# Patient Record
Sex: MALE | Race: WHITE | Employment: OTHER | URBAN - METROPOLITAN AREA
[De-identification: names, ages, dates, MRNs, and addresses within clinical notes are randomized per-mention and may not be internally consistent; named-entity substitution may affect disease eponyms.]

---

## 2018-02-22 ENCOUNTER — HOSPITAL ENCOUNTER (OUTPATIENT)
Dept: PREADMISSION TESTING | Age: 78
Discharge: HOME | End: 2018-02-22
Payer: MEDICARE

## 2018-02-22 DIAGNOSIS — Z01.818 PRE-OP EXAM: ICD-10-CM

## 2018-02-22 LAB
ALBUMIN SERPL-MCNC: 4.2 G/DL (ref 3.4–5)
ALBUMIN/GLOB SERPL: 1.3 {RATIO} (ref 0.8–1.7)
ALP SERPL-CCNC: 77 U/L (ref 45–117)
ALT SERPL-CCNC: 34 U/L (ref 16–61)
ANION GAP SERPL CALC-SCNC: 8 MMOL/L (ref 3–18)
APPEARANCE UR: CLEAR
APTT PPP: 29.2 SEC (ref 23–36.4)
AST SERPL-CCNC: 23 U/L (ref 15–37)
BASOPHILS # BLD: 0 K/UL (ref 0–0.06)
BASOPHILS NFR BLD: 0 % (ref 0–2)
BILIRUB SERPL-MCNC: 0.6 MG/DL (ref 0.2–1)
BILIRUB UR QL: NEGATIVE
BUN SERPL-MCNC: 16 MG/DL (ref 7–18)
BUN/CREAT SERPL: 20 (ref 12–20)
CALCIUM SERPL-MCNC: 9.3 MG/DL (ref 8.5–10.1)
CHLORIDE SERPL-SCNC: 100 MMOL/L (ref 100–108)
CO2 SERPL-SCNC: 32 MMOL/L (ref 21–32)
COLOR UR: YELLOW
CREAT SERPL-MCNC: 0.8 MG/DL (ref 0.6–1.3)
DIFFERENTIAL METHOD BLD: ABNORMAL
EOSINOPHIL # BLD: 0.2 K/UL (ref 0–0.4)
EOSINOPHIL NFR BLD: 2 % (ref 0–5)
ERYTHROCYTE [DISTWIDTH] IN BLOOD BY AUTOMATED COUNT: 13 % (ref 11.6–14.5)
ERYTHROCYTE [SEDIMENTATION RATE] IN BLOOD: 5 MM/HR (ref 0–20)
EST. AVERAGE GLUCOSE BLD GHB EST-MCNC: 108 MG/DL
GLOBULIN SER CALC-MCNC: 3.3 G/DL (ref 2–4)
GLUCOSE SERPL-MCNC: 104 MG/DL (ref 74–99)
GLUCOSE UR STRIP.AUTO-MCNC: NEGATIVE MG/DL
HBA1C MFR BLD: 5.4 % (ref 4.5–5.6)
HCT VFR BLD AUTO: 44.2 % (ref 36–48)
HGB BLD-MCNC: 15.4 G/DL (ref 13–16)
HGB UR QL STRIP: NEGATIVE
INR PPP: 1 (ref 0.8–1.2)
KETONES UR QL STRIP.AUTO: NEGATIVE MG/DL
LEUKOCYTE ESTERASE UR QL STRIP.AUTO: NEGATIVE
LYMPHOCYTES # BLD: 1.5 K/UL (ref 0.9–3.6)
LYMPHOCYTES NFR BLD: 20 % (ref 21–52)
MCH RBC QN AUTO: 32 PG (ref 24–34)
MCHC RBC AUTO-ENTMCNC: 34.8 G/DL (ref 31–37)
MCV RBC AUTO: 91.7 FL (ref 74–97)
MONOCYTES # BLD: 0.8 K/UL (ref 0.05–1.2)
MONOCYTES NFR BLD: 11 % (ref 3–10)
NEUTS SEG # BLD: 5.1 K/UL (ref 1.8–8)
NEUTS SEG NFR BLD: 67 % (ref 40–73)
NITRITE UR QL STRIP.AUTO: NEGATIVE
PH UR STRIP: 6.5 [PH] (ref 5–8)
PLATELET # BLD AUTO: 195 K/UL (ref 135–420)
PMV BLD AUTO: 10.1 FL (ref 9.2–11.8)
POTASSIUM SERPL-SCNC: 3.5 MMOL/L (ref 3.5–5.5)
PROT SERPL-MCNC: 7.5 G/DL (ref 6.4–8.2)
PROT UR STRIP-MCNC: NEGATIVE MG/DL
PROTHROMBIN TIME: 12.6 SEC (ref 11.5–15.2)
RBC # BLD AUTO: 4.82 M/UL (ref 4.7–5.5)
SODIUM SERPL-SCNC: 140 MMOL/L (ref 136–145)
SP GR UR REFRACTOMETRY: 1.01 (ref 1–1.03)
UROBILINOGEN UR QL STRIP.AUTO: 0.2 EU/DL (ref 0.2–1)
WBC # BLD AUTO: 7.6 K/UL (ref 4.6–13.2)

## 2018-02-22 PROCEDURE — 85652 RBC SED RATE AUTOMATED: CPT | Performed by: ORTHOPAEDIC SURGERY

## 2018-02-22 PROCEDURE — 83036 HEMOGLOBIN GLYCOSYLATED A1C: CPT | Performed by: ORTHOPAEDIC SURGERY

## 2018-02-22 PROCEDURE — 93005 ELECTROCARDIOGRAM TRACING: CPT

## 2018-02-22 PROCEDURE — 80053 COMPREHEN METABOLIC PANEL: CPT | Performed by: ORTHOPAEDIC SURGERY

## 2018-02-22 PROCEDURE — 87641 MR-STAPH DNA AMP PROBE: CPT | Performed by: ORTHOPAEDIC SURGERY

## 2018-02-22 PROCEDURE — 81003 URINALYSIS AUTO W/O SCOPE: CPT | Performed by: ORTHOPAEDIC SURGERY

## 2018-02-22 PROCEDURE — 85025 COMPLETE CBC W/AUTO DIFF WBC: CPT | Performed by: ORTHOPAEDIC SURGERY

## 2018-02-22 PROCEDURE — 85730 THROMBOPLASTIN TIME PARTIAL: CPT | Performed by: ORTHOPAEDIC SURGERY

## 2018-02-22 PROCEDURE — 85610 PROTHROMBIN TIME: CPT | Performed by: ORTHOPAEDIC SURGERY

## 2018-02-22 PROCEDURE — 36415 COLL VENOUS BLD VENIPUNCTURE: CPT | Performed by: ORTHOPAEDIC SURGERY

## 2018-02-23 LAB
ATRIAL RATE: 58 BPM
BACTERIA SPEC CULT: NORMAL
CALCULATED P AXIS, ECG09: 55 DEGREES
CALCULATED R AXIS, ECG10: 78 DEGREES
CALCULATED T AXIS, ECG11: 18 DEGREES
DIAGNOSIS, 93000: NORMAL
P-R INTERVAL, ECG05: 186 MS
Q-T INTERVAL, ECG07: 442 MS
QRS DURATION, ECG06: 112 MS
QTC CALCULATION (BEZET), ECG08: 433 MS
SERVICE CMNT-IMP: NORMAL
VENTRICULAR RATE, ECG03: 58 BPM

## 2018-03-19 PROBLEM — M17.11 OSTEOARTHRITIS OF RIGHT KNEE: Chronic | Status: ACTIVE | Noted: 2018-03-19

## 2018-03-19 NOTE — H&P
[address] [facility] [address] Medicine  History and Physical Exam    Patient: Raymon Mena MRN: [id number]  SSN: [id number]    YOB: 1940  Age: 68 y.o. Sex: male      Subjective:      Chief Complaint: Right knee pain    History of Present Illness:  Patient complains of pain to the right knee and difficulty ambulating, which has progressively worsened over several months. X-rays showed osteoarthritis of the joint. The patient's pain has persisted and progressed despite conservative treatments and therapies. The patient has been previously treated with NSAIDs. The patient has at this time opted for surgical intervention. Past Medical History:   Diagnosis Date    Arthritis     knee, hand    Asthma     up to age 15    Hypertension 2004    Osteoarthritis of right knee 3/19/2018     No past surgical history on file. Social History     Occupational History    Not on file. Social History Main Topics    Smoking status: Never Smoker    Smokeless tobacco: Never Used    Alcohol use No    Drug use: No    Sexual activity: Not on file     Prior to Admission medications    Medication Sig Start Date End Date Taking? Authorizing Provider   amLODIPine (NORVASC) 10 mg tablet Take 10 mg by mouth daily. Historical Provider   simvastatin (ZOCOR) 10 mg tablet Take 10 mg by mouth nightly. Historical Provider   losartan-hydroCHLOROthiazide (HYZAAR) 100-25 mg per tablet Take 1 Tab by mouth daily. Historical Provider   ibuprofen (MOTRIN) 400 mg tablet Take  by mouth every six (6) hours as needed for Pain. Historical Provider       Allergies: Allergies   Allergen Reactions    Shrimp Other (comments)     Irritation on hands        Review of Systems:  Pertinent items are noted in the History of Present Illness.     Objective:       Physical Exam:  HEENT: Normocephalic, atraumatic  Lungs:  Clear to auscultation  Heart:   Regular rate and rhythm  Abdomen: Soft  Extremities:  Pain with range of motion of the right knee. Active ROM limited due to pain. Tenderness generalized. Crepitus present. Antalgic gait. Assessment:      Arthritis of the right knee. Plan:       Proceed with scheduled RIGHT TOTAL KNEE ARTHROPLASTY. The various methods of treatment have been discussed with the patient and family. After consideration of risks, benefits, and other options for treatment, the patient has consented to surgical interventions. Questions were answered and preoperative teaching was done by Dr Ezekiel Reyes.      Signed By: SANA Oseguera     March 19, 2018

## 2018-03-20 ENCOUNTER — ANESTHESIA EVENT (OUTPATIENT)
Dept: SURGERY | Age: 78
DRG: 470 | End: 2018-03-20
Payer: MEDICARE

## 2018-03-20 ENCOUNTER — HOSPITAL ENCOUNTER (INPATIENT)
Age: 78
LOS: 1 days | Discharge: HOME HEALTH | DRG: 470 | End: 2018-03-21
Attending: ORTHOPAEDIC SURGERY | Admitting: ORTHOPAEDIC SURGERY
Payer: MEDICARE

## 2018-03-20 ENCOUNTER — APPOINTMENT (OUTPATIENT)
Dept: GENERAL RADIOLOGY | Age: 78
DRG: 470 | End: 2018-03-20
Attending: PHYSICIAN ASSISTANT
Payer: MEDICARE

## 2018-03-20 ENCOUNTER — ANESTHESIA (OUTPATIENT)
Dept: SURGERY | Age: 78
DRG: 470 | End: 2018-03-20
Payer: MEDICARE

## 2018-03-20 DIAGNOSIS — M17.11 PRIMARY OSTEOARTHRITIS OF RIGHT KNEE: Primary | Chronic | ICD-10-CM

## 2018-03-20 LAB
ABO + RH BLD: NORMAL
BLOOD GROUP ANTIBODIES SERPL: NORMAL
SPECIMEN EXP DATE BLD: NORMAL

## 2018-03-20 PROCEDURE — 77030013708 HC HNDPC SUC IRR PULS STRY –B: Performed by: ORTHOPAEDIC SURGERY

## 2018-03-20 PROCEDURE — 76210000006 HC OR PH I REC 0.5 TO 1 HR: Performed by: ORTHOPAEDIC SURGERY

## 2018-03-20 PROCEDURE — 77030031139 HC SUT VCRL2 J&J -A: Performed by: ORTHOPAEDIC SURGERY

## 2018-03-20 PROCEDURE — 77030027138 HC INCENT SPIROMETER -A: Performed by: ORTHOPAEDIC SURGERY

## 2018-03-20 PROCEDURE — 74011250637 HC RX REV CODE- 250/637: Performed by: ORTHOPAEDIC SURGERY

## 2018-03-20 PROCEDURE — 77030002934 HC SUT MCRYL J&J -B: Performed by: ORTHOPAEDIC SURGERY

## 2018-03-20 PROCEDURE — C1776 JOINT DEVICE (IMPLANTABLE): HCPCS | Performed by: ORTHOPAEDIC SURGERY

## 2018-03-20 PROCEDURE — 74011000258 HC RX REV CODE- 258: Performed by: ORTHOPAEDIC SURGERY

## 2018-03-20 PROCEDURE — 77030020782 HC GWN BAIR PAWS FLX 3M -B: Performed by: ORTHOPAEDIC SURGERY

## 2018-03-20 PROCEDURE — 77030037876 HC DRSG MEPILEX 16-48IN BORD MOLN -A: Performed by: ORTHOPAEDIC SURGERY

## 2018-03-20 PROCEDURE — 77030020259 HC SOL INJ SOD CL 0.9% 100ML BG: Performed by: ORTHOPAEDIC SURGERY

## 2018-03-20 PROCEDURE — C1713 ANCHOR/SCREW BN/BN,TIS/BN: HCPCS | Performed by: ORTHOPAEDIC SURGERY

## 2018-03-20 PROCEDURE — 77030012508 HC MSK AIRWY LMA AMBU -A: Performed by: SPECIALIST

## 2018-03-20 PROCEDURE — 74011250637 HC RX REV CODE- 250/637: Performed by: PHYSICIAN ASSISTANT

## 2018-03-20 PROCEDURE — 77030011628: Performed by: ORTHOPAEDIC SURGERY

## 2018-03-20 PROCEDURE — 73560 X-RAY EXAM OF KNEE 1 OR 2: CPT

## 2018-03-20 PROCEDURE — 76060000034 HC ANESTHESIA 1.5 TO 2 HR: Performed by: ORTHOPAEDIC SURGERY

## 2018-03-20 PROCEDURE — 36415 COLL VENOUS BLD VENIPUNCTURE: CPT | Performed by: ORTHOPAEDIC SURGERY

## 2018-03-20 PROCEDURE — 86900 BLOOD TYPING SEROLOGIC ABO: CPT | Performed by: ORTHOPAEDIC SURGERY

## 2018-03-20 PROCEDURE — 77030034694 HC SCPL CANADY PLSM DISP USMD -E: Performed by: ORTHOPAEDIC SURGERY

## 2018-03-20 PROCEDURE — 77030003666 HC NDL SPINAL BD -A: Performed by: ORTHOPAEDIC SURGERY

## 2018-03-20 PROCEDURE — 77030020813 HC INST SCULP CEM KT DISP S&N -B: Performed by: ORTHOPAEDIC SURGERY

## 2018-03-20 PROCEDURE — 76942 ECHO GUIDE FOR BIOPSY: CPT | Performed by: ORTHOPAEDIC SURGERY

## 2018-03-20 PROCEDURE — 3E0T3BZ INTRODUCTION OF ANESTHETIC AGENT INTO PERIPHERAL NERVES AND PLEXI, PERCUTANEOUS APPROACH: ICD-10-PCS | Performed by: SPECIALIST

## 2018-03-20 PROCEDURE — 97161 PT EVAL LOW COMPLEX 20 MIN: CPT

## 2018-03-20 PROCEDURE — 74011250636 HC RX REV CODE- 250/636: Performed by: SPECIALIST

## 2018-03-20 PROCEDURE — 97116 GAIT TRAINING THERAPY: CPT

## 2018-03-20 PROCEDURE — 64450 NJX AA&/STRD OTHER PN/BRANCH: CPT | Performed by: ORTHOPAEDIC SURGERY

## 2018-03-20 PROCEDURE — 74011250636 HC RX REV CODE- 250/636: Performed by: ORTHOPAEDIC SURGERY

## 2018-03-20 PROCEDURE — 74011250637 HC RX REV CODE- 250/637: Performed by: SPECIALIST

## 2018-03-20 PROCEDURE — 77030011640 HC PAD GRND REM COVD -A: Performed by: ORTHOPAEDIC SURGERY

## 2018-03-20 PROCEDURE — 74011250636 HC RX REV CODE- 250/636: Performed by: PHYSICIAN ASSISTANT

## 2018-03-20 PROCEDURE — 74011000250 HC RX REV CODE- 250

## 2018-03-20 PROCEDURE — 76010000153 HC OR TIME 1.5 TO 2 HR: Performed by: ORTHOPAEDIC SURGERY

## 2018-03-20 PROCEDURE — 77030032489 HC SLV COMPR SCD FT CUF COVD -B: Performed by: ORTHOPAEDIC SURGERY

## 2018-03-20 PROCEDURE — 74011250636 HC RX REV CODE- 250/636

## 2018-03-20 PROCEDURE — 0SRC0J9 REPLACEMENT OF RIGHT KNEE JOINT WITH SYNTHETIC SUBSTITUTE, CEMENTED, OPEN APPROACH: ICD-10-PCS | Performed by: ORTHOPAEDIC SURGERY

## 2018-03-20 PROCEDURE — 74011000250 HC RX REV CODE- 250: Performed by: ORTHOPAEDIC SURGERY

## 2018-03-20 PROCEDURE — 77030038010: Performed by: ORTHOPAEDIC SURGERY

## 2018-03-20 PROCEDURE — 65270000029 HC RM PRIVATE

## 2018-03-20 PROCEDURE — 77030018836 HC SOL IRR NACL ICUM -A: Performed by: ORTHOPAEDIC SURGERY

## 2018-03-20 PROCEDURE — 77030037400 HC ADH TISS HI VISC EXOFIN CHMP -B: Performed by: ORTHOPAEDIC SURGERY

## 2018-03-20 PROCEDURE — 77030036563 HC WRP CLD THER KNE S2SG -B: Performed by: ORTHOPAEDIC SURGERY

## 2018-03-20 PROCEDURE — C9290 INJ, BUPIVACAINE LIPOSOME: HCPCS | Performed by: ORTHOPAEDIC SURGERY

## 2018-03-20 DEVICE — CEMENT BNE 40GM FULL DOSE PMMA W/O ANTIBIO HI VISC N RADPQ: Type: IMPLANTABLE DEVICE | Site: KNEE | Status: FUNCTIONAL

## 2018-03-20 RX ORDER — ASPIRIN 81 MG/1
81 TABLET ORAL 2 TIMES DAILY
Status: DISCONTINUED | OUTPATIENT
Start: 2018-03-20 | End: 2018-03-21 | Stop reason: HOSPADM

## 2018-03-20 RX ORDER — SODIUM CHLORIDE 9 MG/ML
300 INJECTION, SOLUTION INTRAVENOUS CONTINUOUS
Status: DISPENSED | OUTPATIENT
Start: 2018-03-20 | End: 2018-03-20

## 2018-03-20 RX ORDER — DEXTROSE 50 % IN WATER (D50W) INTRAVENOUS SYRINGE
25-50 AS NEEDED
Status: DISCONTINUED | OUTPATIENT
Start: 2018-03-20 | End: 2018-03-20 | Stop reason: HOSPADM

## 2018-03-20 RX ORDER — MELOXICAM 7.5 MG/1
7.5 TABLET ORAL 2 TIMES DAILY
Qty: 28 TAB | Refills: 0 | Status: SHIPPED | OUTPATIENT
Start: 2018-03-20 | End: 2018-04-03

## 2018-03-20 RX ORDER — AMLODIPINE BESYLATE 5 MG/1
10 TABLET ORAL DAILY
Status: DISCONTINUED | OUTPATIENT
Start: 2018-03-21 | End: 2018-03-21 | Stop reason: HOSPADM

## 2018-03-20 RX ORDER — PROPOFOL 10 MG/ML
INJECTION, EMULSION INTRAVENOUS AS NEEDED
Status: DISCONTINUED | OUTPATIENT
Start: 2018-03-20 | End: 2018-03-20 | Stop reason: HOSPADM

## 2018-03-20 RX ORDER — ACETAMINOPHEN 325 MG/1
650 TABLET ORAL EVERY 6 HOURS
Status: DISCONTINUED | OUTPATIENT
Start: 2018-03-20 | End: 2018-03-21 | Stop reason: HOSPADM

## 2018-03-20 RX ORDER — NALOXONE HYDROCHLORIDE 0.4 MG/ML
0.2 INJECTION, SOLUTION INTRAMUSCULAR; INTRAVENOUS; SUBCUTANEOUS AS NEEDED
Status: DISCONTINUED | OUTPATIENT
Start: 2018-03-20 | End: 2018-03-20 | Stop reason: HOSPADM

## 2018-03-20 RX ORDER — CELECOXIB 100 MG/1
400 CAPSULE ORAL
Status: COMPLETED | OUTPATIENT
Start: 2018-03-20 | End: 2018-03-20

## 2018-03-20 RX ORDER — ONDANSETRON 2 MG/ML
4 INJECTION INTRAMUSCULAR; INTRAVENOUS
Status: DISCONTINUED | OUTPATIENT
Start: 2018-03-20 | End: 2018-03-21 | Stop reason: HOSPADM

## 2018-03-20 RX ORDER — CEFAZOLIN SODIUM 2 G/50ML
2 SOLUTION INTRAVENOUS ONCE
Status: COMPLETED | OUTPATIENT
Start: 2018-03-20 | End: 2018-03-20

## 2018-03-20 RX ORDER — SODIUM CHLORIDE 0.9 % (FLUSH) 0.9 %
5-10 SYRINGE (ML) INJECTION AS NEEDED
Status: DISCONTINUED | OUTPATIENT
Start: 2018-03-20 | End: 2018-03-20 | Stop reason: HOSPADM

## 2018-03-20 RX ORDER — METOCLOPRAMIDE HYDROCHLORIDE 5 MG/ML
10 INJECTION INTRAMUSCULAR; INTRAVENOUS
Status: DISCONTINUED | OUTPATIENT
Start: 2018-03-20 | End: 2018-03-21 | Stop reason: HOSPADM

## 2018-03-20 RX ORDER — ZOLPIDEM TARTRATE 5 MG/1
5-10 TABLET ORAL
Status: DISCONTINUED | OUTPATIENT
Start: 2018-03-20 | End: 2018-03-21 | Stop reason: HOSPADM

## 2018-03-20 RX ORDER — INSULIN LISPRO 100 [IU]/ML
INJECTION, SOLUTION INTRAVENOUS; SUBCUTANEOUS ONCE
Status: DISCONTINUED | OUTPATIENT
Start: 2018-03-20 | End: 2018-03-20 | Stop reason: HOSPADM

## 2018-03-20 RX ORDER — DEXMEDETOMIDINE HYDROCHLORIDE 4 UG/ML
INJECTION, SOLUTION INTRAVENOUS AS NEEDED
Status: DISCONTINUED | OUTPATIENT
Start: 2018-03-20 | End: 2018-03-20 | Stop reason: HOSPADM

## 2018-03-20 RX ORDER — ACETAMINOPHEN 10 MG/ML
1000 INJECTION, SOLUTION INTRAVENOUS ONCE
Status: COMPLETED | OUTPATIENT
Start: 2018-03-20 | End: 2018-03-20

## 2018-03-20 RX ORDER — OXYCODONE HYDROCHLORIDE 5 MG/1
5-10 TABLET ORAL
Status: DISCONTINUED | OUTPATIENT
Start: 2018-03-20 | End: 2018-03-21 | Stop reason: HOSPADM

## 2018-03-20 RX ORDER — GLYCOPYRROLATE 0.2 MG/ML
INJECTION INTRAMUSCULAR; INTRAVENOUS AS NEEDED
Status: DISCONTINUED | OUTPATIENT
Start: 2018-03-20 | End: 2018-03-20 | Stop reason: HOSPADM

## 2018-03-20 RX ORDER — DEXAMETHASONE SODIUM PHOSPHATE 4 MG/ML
8 INJECTION, SOLUTION INTRA-ARTICULAR; INTRALESIONAL; INTRAMUSCULAR; INTRAVENOUS; SOFT TISSUE ONCE
Status: COMPLETED | OUTPATIENT
Start: 2018-03-20 | End: 2018-03-20

## 2018-03-20 RX ORDER — OXYCODONE AND ACETAMINOPHEN 5; 325 MG/1; MG/1
TABLET ORAL
Qty: 60 TAB | Refills: 0 | Status: SHIPPED | OUTPATIENT
Start: 2018-03-20 | End: 2019-06-07

## 2018-03-20 RX ORDER — TRANEXAMIC ACID 650 1/1
1950 TABLET ORAL ONCE
Status: COMPLETED | OUTPATIENT
Start: 2018-03-20 | End: 2018-03-20

## 2018-03-20 RX ORDER — LANOLIN ALCOHOL/MO/W.PET/CERES
1 CREAM (GRAM) TOPICAL 3 TIMES DAILY
Status: DISCONTINUED | OUTPATIENT
Start: 2018-03-20 | End: 2018-03-21 | Stop reason: HOSPADM

## 2018-03-20 RX ORDER — PANTOPRAZOLE SODIUM 40 MG/1
40 TABLET, DELAYED RELEASE ORAL DAILY
Status: DISCONTINUED | OUTPATIENT
Start: 2018-03-20 | End: 2018-03-21 | Stop reason: HOSPADM

## 2018-03-20 RX ORDER — DIPHENHYDRAMINE HCL 25 MG
25 CAPSULE ORAL
Status: DISCONTINUED | OUTPATIENT
Start: 2018-03-20 | End: 2018-03-21 | Stop reason: HOSPADM

## 2018-03-20 RX ORDER — PREGABALIN 75 MG/1
75 CAPSULE ORAL
Status: COMPLETED | OUTPATIENT
Start: 2018-03-20 | End: 2018-03-20

## 2018-03-20 RX ORDER — CEFAZOLIN SODIUM 2 G/50ML
2 SOLUTION INTRAVENOUS EVERY 8 HOURS
Status: COMPLETED | OUTPATIENT
Start: 2018-03-20 | End: 2018-03-21

## 2018-03-20 RX ORDER — DIPHENHYDRAMINE HYDROCHLORIDE 50 MG/ML
12.5 INJECTION, SOLUTION INTRAMUSCULAR; INTRAVENOUS
Status: DISCONTINUED | OUTPATIENT
Start: 2018-03-20 | End: 2018-03-21 | Stop reason: HOSPADM

## 2018-03-20 RX ORDER — SODIUM CHLORIDE 9 MG/ML
125 INJECTION, SOLUTION INTRAVENOUS CONTINUOUS
Status: DISCONTINUED | OUTPATIENT
Start: 2018-03-20 | End: 2018-03-21 | Stop reason: HOSPADM

## 2018-03-20 RX ORDER — FENTANYL CITRATE 50 UG/ML
25 INJECTION, SOLUTION INTRAMUSCULAR; INTRAVENOUS
Status: DISCONTINUED | OUTPATIENT
Start: 2018-03-20 | End: 2018-03-20 | Stop reason: HOSPADM

## 2018-03-20 RX ORDER — ACETAMINOPHEN 10 MG/ML
1000 INJECTION, SOLUTION INTRAVENOUS EVERY 6 HOURS
Status: DISCONTINUED | OUTPATIENT
Start: 2018-03-20 | End: 2018-03-20 | Stop reason: CLARIF

## 2018-03-20 RX ORDER — ASPIRIN 325 MG
325 TABLET ORAL 2 TIMES DAILY
Qty: 42 TAB | Refills: 0 | Status: SHIPPED | OUTPATIENT
Start: 2018-03-20 | End: 2018-04-10

## 2018-03-20 RX ORDER — SODIUM CHLORIDE 0.9 % (FLUSH) 0.9 %
5-10 SYRINGE (ML) INJECTION AS NEEDED
Status: DISCONTINUED | OUTPATIENT
Start: 2018-03-20 | End: 2018-03-21 | Stop reason: HOSPADM

## 2018-03-20 RX ORDER — KETOROLAC TROMETHAMINE 15 MG/ML
15 INJECTION, SOLUTION INTRAMUSCULAR; INTRAVENOUS EVERY 6 HOURS
Status: DISCONTINUED | OUTPATIENT
Start: 2018-03-20 | End: 2018-03-21 | Stop reason: HOSPADM

## 2018-03-20 RX ORDER — SODIUM CHLORIDE, SODIUM LACTATE, POTASSIUM CHLORIDE, CALCIUM CHLORIDE 600; 310; 30; 20 MG/100ML; MG/100ML; MG/100ML; MG/100ML
100 INJECTION, SOLUTION INTRAVENOUS CONTINUOUS
Status: DISCONTINUED | OUTPATIENT
Start: 2018-03-20 | End: 2018-03-20 | Stop reason: HOSPADM

## 2018-03-20 RX ORDER — MELOXICAM 7.5 MG/1
7.5 TABLET ORAL 2 TIMES DAILY
Status: DISCONTINUED | OUTPATIENT
Start: 2018-03-20 | End: 2018-03-21 | Stop reason: HOSPADM

## 2018-03-20 RX ORDER — NALOXONE HYDROCHLORIDE 0.4 MG/ML
0.4 INJECTION, SOLUTION INTRAMUSCULAR; INTRAVENOUS; SUBCUTANEOUS AS NEEDED
Status: DISCONTINUED | OUTPATIENT
Start: 2018-03-20 | End: 2018-03-21 | Stop reason: HOSPADM

## 2018-03-20 RX ORDER — SODIUM CHLORIDE 0.9 % (FLUSH) 0.9 %
5-10 SYRINGE (ML) INJECTION EVERY 8 HOURS
Status: DISCONTINUED | OUTPATIENT
Start: 2018-03-20 | End: 2018-03-21 | Stop reason: HOSPADM

## 2018-03-20 RX ORDER — ONDANSETRON 2 MG/ML
INJECTION INTRAMUSCULAR; INTRAVENOUS AS NEEDED
Status: DISCONTINUED | OUTPATIENT
Start: 2018-03-20 | End: 2018-03-20 | Stop reason: HOSPADM

## 2018-03-20 RX ORDER — SODIUM CHLORIDE, SODIUM LACTATE, POTASSIUM CHLORIDE, CALCIUM CHLORIDE 600; 310; 30; 20 MG/100ML; MG/100ML; MG/100ML; MG/100ML
125 INJECTION, SOLUTION INTRAVENOUS CONTINUOUS
Status: DISCONTINUED | OUTPATIENT
Start: 2018-03-20 | End: 2018-03-21 | Stop reason: HOSPADM

## 2018-03-20 RX ORDER — FENTANYL CITRATE 50 UG/ML
INJECTION, SOLUTION INTRAMUSCULAR; INTRAVENOUS AS NEEDED
Status: DISCONTINUED | OUTPATIENT
Start: 2018-03-20 | End: 2018-03-20 | Stop reason: HOSPADM

## 2018-03-20 RX ORDER — MAGNESIUM SULFATE 100 %
4 CRYSTALS MISCELLANEOUS AS NEEDED
Status: DISCONTINUED | OUTPATIENT
Start: 2018-03-20 | End: 2018-03-20 | Stop reason: HOSPADM

## 2018-03-20 RX ORDER — LIDOCAINE HYDROCHLORIDE 20 MG/ML
INJECTION, SOLUTION EPIDURAL; INFILTRATION; INTRACAUDAL; PERINEURAL AS NEEDED
Status: DISCONTINUED | OUTPATIENT
Start: 2018-03-20 | End: 2018-03-20 | Stop reason: HOSPADM

## 2018-03-20 RX ORDER — SIMVASTATIN 10 MG/1
10 TABLET, FILM COATED ORAL
Status: DISCONTINUED | OUTPATIENT
Start: 2018-03-20 | End: 2018-03-21 | Stop reason: HOSPADM

## 2018-03-20 RX ORDER — MIDAZOLAM HYDROCHLORIDE 1 MG/ML
INJECTION, SOLUTION INTRAMUSCULAR; INTRAVENOUS AS NEEDED
Status: DISCONTINUED | OUTPATIENT
Start: 2018-03-20 | End: 2018-03-20 | Stop reason: HOSPADM

## 2018-03-20 RX ORDER — DOCUSATE SODIUM 100 MG/1
100 CAPSULE, LIQUID FILLED ORAL 2 TIMES DAILY
Status: DISCONTINUED | OUTPATIENT
Start: 2018-03-20 | End: 2018-03-21 | Stop reason: HOSPADM

## 2018-03-20 RX ADMIN — MIDAZOLAM HYDROCHLORIDE 2 MG: 1 INJECTION, SOLUTION INTRAMUSCULAR; INTRAVENOUS at 11:25

## 2018-03-20 RX ADMIN — DOCUSATE SODIUM 100 MG: 100 CAPSULE, LIQUID FILLED ORAL at 21:06

## 2018-03-20 RX ADMIN — KETOROLAC TROMETHAMINE 15 MG: 15 INJECTION, SOLUTION INTRAMUSCULAR; INTRAVENOUS at 23:23

## 2018-03-20 RX ADMIN — DEXMEDETOMIDINE HYDROCHLORIDE 8 MCG: 4 INJECTION, SOLUTION INTRAVENOUS at 12:08

## 2018-03-20 RX ADMIN — SODIUM CHLORIDE, SODIUM LACTATE, POTASSIUM CHLORIDE, AND CALCIUM CHLORIDE: 600; 310; 30; 20 INJECTION, SOLUTION INTRAVENOUS at 12:08

## 2018-03-20 RX ADMIN — DEXMEDETOMIDINE HYDROCHLORIDE 8 MCG: 4 INJECTION, SOLUTION INTRAVENOUS at 12:02

## 2018-03-20 RX ADMIN — ACETAMINOPHEN 650 MG: 325 TABLET ORAL at 23:22

## 2018-03-20 RX ADMIN — SODIUM CHLORIDE, SODIUM LACTATE, POTASSIUM CHLORIDE, AND CALCIUM CHLORIDE 125 ML/HR: 600; 310; 30; 20 INJECTION, SOLUTION INTRAVENOUS at 13:34

## 2018-03-20 RX ADMIN — DEXMEDETOMIDINE HYDROCHLORIDE 8 MCG: 4 INJECTION, SOLUTION INTRAVENOUS at 12:17

## 2018-03-20 RX ADMIN — DEXAMETHASONE SODIUM PHOSPHATE 8 MG: 4 INJECTION, SOLUTION INTRAMUSCULAR; INTRAVENOUS at 10:20

## 2018-03-20 RX ADMIN — DEXMEDETOMIDINE HYDROCHLORIDE 16 MCG: 4 INJECTION, SOLUTION INTRAVENOUS at 11:55

## 2018-03-20 RX ADMIN — SODIUM CHLORIDE 125 ML/HR: 900 INJECTION, SOLUTION INTRAVENOUS at 18:08

## 2018-03-20 RX ADMIN — KETOROLAC TROMETHAMINE 15 MG: 15 INJECTION, SOLUTION INTRAMUSCULAR; INTRAVENOUS at 17:38

## 2018-03-20 RX ADMIN — OXYCODONE HYDROCHLORIDE 10 MG: 5 TABLET ORAL at 13:38

## 2018-03-20 RX ADMIN — MIDAZOLAM HYDROCHLORIDE 4 MG: 1 INJECTION, SOLUTION INTRAMUSCULAR; INTRAVENOUS at 10:23

## 2018-03-20 RX ADMIN — ASPIRIN 81 MG: 81 TABLET, COATED ORAL at 21:07

## 2018-03-20 RX ADMIN — ACETAMINOPHEN 1000 MG: 10 INJECTION, SOLUTION INTRAVENOUS at 11:25

## 2018-03-20 RX ADMIN — LIDOCAINE HYDROCHLORIDE 80 MG: 20 INJECTION, SOLUTION EPIDURAL; INFILTRATION; INTRACAUDAL; PERINEURAL at 11:35

## 2018-03-20 RX ADMIN — SODIUM CHLORIDE 300 ML/HR: 900 INJECTION, SOLUTION INTRAVENOUS at 14:49

## 2018-03-20 RX ADMIN — KETOROLAC TROMETHAMINE 15 MG: 15 INJECTION, SOLUTION INTRAMUSCULAR; INTRAVENOUS at 13:32

## 2018-03-20 RX ADMIN — ACETAMINOPHEN 650 MG: 325 TABLET ORAL at 17:38

## 2018-03-20 RX ADMIN — GLYCOPYRROLATE 0.2 MG: 0.2 INJECTION INTRAMUSCULAR; INTRAVENOUS at 11:25

## 2018-03-20 RX ADMIN — PROPOFOL 200 MG: 10 INJECTION, EMULSION INTRAVENOUS at 11:35

## 2018-03-20 RX ADMIN — FERROUS SULFATE TAB 325 MG (65 MG ELEMENTAL FE) 325 MG: 325 (65 FE) TAB at 21:07

## 2018-03-20 RX ADMIN — TRANEXAMIC ACID 1950 MG: 650 TABLET ORAL at 09:55

## 2018-03-20 RX ADMIN — FENTANYL CITRATE 50 MCG: 50 INJECTION, SOLUTION INTRAMUSCULAR; INTRAVENOUS at 12:06

## 2018-03-20 RX ADMIN — PANTOPRAZOLE SODIUM 40 MG: 40 TABLET, DELAYED RELEASE ORAL at 10:20

## 2018-03-20 RX ADMIN — FERROUS SULFATE TAB 325 MG (65 MG ELEMENTAL FE) 325 MG: 325 (65 FE) TAB at 17:38

## 2018-03-20 RX ADMIN — PREGABALIN 75 MG: 75 CAPSULE ORAL at 10:20

## 2018-03-20 RX ADMIN — CEFAZOLIN SODIUM 2 G: 2 SOLUTION INTRAVENOUS at 19:31

## 2018-03-20 RX ADMIN — CELECOXIB 400 MG: 100 CAPSULE ORAL at 10:20

## 2018-03-20 RX ADMIN — SIMVASTATIN 10 MG: 10 TABLET, FILM COATED ORAL at 21:07

## 2018-03-20 RX ADMIN — SODIUM CHLORIDE, SODIUM LACTATE, POTASSIUM CHLORIDE, AND CALCIUM CHLORIDE: 600; 310; 30; 20 INJECTION, SOLUTION INTRAVENOUS at 11:51

## 2018-03-20 RX ADMIN — SODIUM CHLORIDE, SODIUM LACTATE, POTASSIUM CHLORIDE, AND CALCIUM CHLORIDE 125 ML/HR: 600; 310; 30; 20 INJECTION, SOLUTION INTRAVENOUS at 09:59

## 2018-03-20 RX ADMIN — CEFAZOLIN SODIUM 2 G: 2 SOLUTION INTRAVENOUS at 11:28

## 2018-03-20 RX ADMIN — MELOXICAM 7.5 MG: 7.5 TABLET ORAL at 21:06

## 2018-03-20 RX ADMIN — SODIUM CHLORIDE, SODIUM LACTATE, POTASSIUM CHLORIDE, AND CALCIUM CHLORIDE 1000 ML: 600; 310; 30; 20 INJECTION, SOLUTION INTRAVENOUS at 10:00

## 2018-03-20 RX ADMIN — ONDANSETRON 4 MG: 2 INJECTION INTRAMUSCULAR; INTRAVENOUS at 11:38

## 2018-03-20 RX ADMIN — FENTANYL CITRATE 50 MCG: 50 INJECTION, SOLUTION INTRAMUSCULAR; INTRAVENOUS at 11:35

## 2018-03-20 NOTE — IP AVS SNAPSHOT
[facility] 
 
 
 [address] [place] [id number] 
[phone] Patient: Andrew Yeager MRN: [id number] TQU:84/86/2301 A check beverly indicates which time of day the medication should be taken. My Medications START taking these medications Instructions Each Dose to Equal  
 Morning Noon Evening Bedtime  
 aspirin 325 mg tablet Commonly known as:  ASPIRIN Your last dose was: Your next dose is: Take 1 Tab by mouth two (2) times a day for 21 days. 325 mg  
    
   
   
   
  
 meloxicam 7.5 mg tablet Commonly known as:  MOBIC Your last dose was: Your next dose is: Take 1 Tab by mouth two (2) times a day for 14 days. 7.5 mg  
    
   
   
   
  
 oxyCODONE-acetaminophen 5-325 mg per tablet Commonly known as:  PERCOCET Your last dose was: Your next dose is: Take 1-2 tablets by mouth every 4-6 hours as needed for pain. CONTINUE taking these medications Instructions Each Dose to Equal  
 Morning Noon Evening Bedtime  
 amLODIPine 10 mg tablet Commonly known as:  Jillyn Boast Your last dose was: Your next dose is: Take 10 mg by mouth daily. 10 mg  
    
   
   
   
  
 losartan-hydroCHLOROthiazide 100-25 mg per tablet Commonly known as:  HYZAAR Your last dose was: Your next dose is: Take 1 Tab by mouth daily. 1 Tab  
    
   
   
   
  
 simvastatin 10 mg tablet Commonly known as:  ZOCOR Your last dose was: Your next dose is: Take 10 mg by mouth nightly. 10 mg  
    
   
   
   
  
  
STOP taking these medications   
 ibuprofen 400 mg tablet Commonly known as:  MOTRIN Where to Get Your Medications Information on where to get these meds will be given to you by the nurse or doctor. ! Ask your nurse or doctor about these medications  
  aspirin 325 mg tablet  
 meloxicam 7.5 mg tablet  
 oxyCODONE-acetaminophen 5-325 mg per tablet

## 2018-03-20 NOTE — OP NOTES
[address] [place] [address],[address] Medicine  Total Knee Arthroplasty    Patient: Izzy Loja MRN: [id number]  SSN: [id number]    YOB: 1940  Age: 68 y.o. Sex: male      Date of Surgery: 3/20/2018   Preoperative Diagnosis: UNILATERAL PRIMARY OSTEOARTHRITIS, RIGHT KNEE   Postoperative Diagnosis: UNILATERAL PRIMARY OSTEOARTHRITIS, RIGHT KNEE   Location: [facility]  Surgeon: Anita Son MD  Assistant: Aretha Suh PA-C    Anesthesia: General and Femoral Nerve Block    Procedure: Total Knee Arthroplasty: orthodevelopment  The complexity of the total joint surgery requires the use of a first assistant for positioning, retraction and assistance in closure. Tourniquet Time: Tourniquet not used. Estimated Blood Loss: Less than 100cc     Implants:   Implant Name Type Inv. Item Serial No.  Lot No. LRB No. Used Action   CEMENT BNE SMARTSET HV 40GM -- SMARTSET - [id number]  CEMENT BNE SMARTSET HV 40GM -- SMARTSET  [facility] [employer] [id number] Right 1 Implanted   CEMENT BNE SMARTSET HV 40GM -- SMARTSET - [id number]  CEMENT BNE SMARTSET HV 40GM -- SMARTSET  [facility] [employer] [id number] Right 1 Implanted   SIZE 5 TIBIAL TRAY NONPOROUS    ORTHO DEVELOPMENT [id number] Right 1 Implanted   SIZE 5 7MM TIBIAL INSERT CR    ORTHO DEVELOPMENT [id number] Right 1 Implanted   35MM PATELLA    ORTHO DEVELOPMENT [id number] Right 1 Implanted   RT SIZE 6 FEMORAL CR NONPOROUS       ORTHO DEVELOPMENT [id number] Right 1 Implanted        Specimens: None    Additional Findings: None     Body Mass Index: Body mass index is 28.79 kg/(m^2). Procedure Detail:  Prior to the surgery the patient was administered a femoral nerve block in the preoperative holding area by the anesthesiologist. Izzy Loja was brought to the operating room and positioned on the operating table. He was anesthetized with anesthesia. Intravenous antibiotics were administered.  Prior to the incision being made a timeout was called identifying the patient, procedure, operative side, and surgeon. A pneumatic tourniquet was placed about the limb and the right leg was prepped and draped in the usual sterile manner. The tourniquet was not inflated throughout the case. A midline anterior incision made over the knee. The incision was carried down through the subcutaneous tissue to the underlying capsule. A medial parapatellar capsular incision was performed. The medial capsular flap was carefully elevated around to the posterior medial corner protecting the medial collateral ligaments and the fibers. The patella was sized with a caliper, and approximately 10-12 mm was resected with an oscillating saw allowing the patella to be slid into the lateral gutter. It was not everted throughout the case. Our attention was first turned to the distal femur and using intermedullary instrumentation, a 5-degree valgus cut was on the distal end of the femur. The distal end of the femur was sized to a size 6 femoral component. Pins were inserted through the sizer and the corresponding 4-in-1 block was slid into place and pinned for stability. Anterior and posterior and chamfer cuts were made to accommodate the femoral component. The medial and lateral menisci were excised as were the anterior cruciate ligaments. Our attention was then turned to the tibia. Using extramedullary instrumentation, a 3-degree cut was made on the proximal end of the tibia. A spacer block was placed to show gaps had been balanced and a size 5  tibial base plate was placed on the tibia and pinned into place. Intramedullary reaming guide was placed on the tibia and the appropriate reamer was used followed by the keel punch to complete the preparation of the tibia. A trial femoral component was then impacted on to the distal end of the femur. Trial reduction was then performed with incremental size trial bearing surfaces.  The Orthodevelopment BKS CR 7mm bearing surface matching the femur was inserted and allowed for full extension, good medial, lateral stability at 90 degrees of flexion, especially medially. Our attention was then turned to the patella. The patella was sized to a 35mm oval DePuy patella. The guide was pinned, placed over patella, 3 holes were drilled. Trial patella button was inserted and the patella was reduced in the knee. The patella tracked normally using no-touch technique. The trial components were then all removed. The real components were opened on the back. The cut surfaces of the bone were prepared using the PulsaVac lavage. Prior to this, the Aquamantys was used to cauterize any soft tissue bleeding. 40 grams of Depuy HV cement was mixed. The femoral and tibial components  and patellar component was cemented into place. Excess cement was removed from around the edge of bone using plastic curette. Once the cement had hardened, the knee was placed through range of motion and noted to be stable as mentioned above with the trail components. The wound was dry, therefore no drain was used. The operative knee was injected with 20 cc of exparel. The knee was then soaked with a diluted betadine solution for approximately 3 min. This was then thoroughly irrigated. The capsular layer was closed using a #2 stratafix suture with the knee flexed 90 degrees, while subcutaneous layers were closed using 2-0 Vicryl suture. Finally the skin was closed using Prineo, which were applied in occlusive fashion and sterile bandage applied. An Iceman cryotherapy pad was applied on the operative leg. Sponge count and needle counts were correct. He was taken to the recovery room extubated in stable condition.     Signed By: Anita Son MD     March 20, 2018

## 2018-03-20 NOTE — PERIOP NOTES
Pt transferred to room 209. Jean-Paul Nix RN assuming care. Blood pressure 129/70, pulse 67, temperature 97.4 °F (36.3 °C), resp. rate 16, height 5' 8.5\" (1.74 m), weight 87.1 kg (192 lb 2 oz), SpO2 98 %. See admission note for skin assessment.

## 2018-03-20 NOTE — PERIOP NOTES
TRANSFER - OUT REPORT:    Verbal report given to Margarita Abraham RN(name) on Sahara Slaughter  being transferred to room 209(unit) for routine progression of care       Report consisted of patients Situation, Background, Assessment and   Recommendations(SBAR). Information from the following report(s) OR Summary, Intake/Output and MAR was reviewed with the receiving nurse. Lines:   Peripheral IV 03/20/18 Right Forearm (Active)   Site Assessment Clean, dry, & intact 3/20/2018  1:45 PM   Phlebitis Assessment 0 3/20/2018  1:45 PM   Infiltration Assessment 0 3/20/2018  1:45 PM   Dressing Status Clean, dry, & intact 3/20/2018  1:45 PM   Dressing Type Transparent 3/20/2018  1:45 PM   Hub Color/Line Status Green; Infusing 3/20/2018  1:45 PM        Opportunity for questions and clarification was provided.       Patient transported with:   O2 @ 2 liters

## 2018-03-20 NOTE — PERIOP NOTES
Pt assigned to room 209. Skylar Rashid RN looking at [employer]. Family updated on pts progression of care.

## 2018-03-20 NOTE — PROGRESS NOTES
1435 - Patient arrives to unit at this time, dual skin assessment completed, no wounds noted. PACU nurse stated he has scab to right heel, ACE dressing in place and unable to assess. Patient asked to void, he wanted to ambulate to restroom, straight leg test completed, patient able to lift leg multiple times without assistance, patient assisted to side of bed and then ambulated to restroom with assistance of staff. Patient voided without difficulty and ambulated back to bed. Admission assessment completed. Patient is A/O x 4. Lungs clear, radial pulses present , pedal pulses present , abdomen soft and non-distended. Bowel sounds active, 18 G IV to RFA  intact and patent infusing. No signs of phlebitis or infiltration noted. JAGJIT hose applied to LLE and plexis applied bilaterally. Skin warm and dry  with  ACE dressing to RLE CDI. Patient denies numbness/tingling. Pain 3/10. Patient was oriented to the room to include use of call bell, meal ordering, and use of incentive spirometer. Patient was given explanation of \" up for dinner\" program and has verbalized understanding. Phone and call bell left within reach. Plan of care for the day addressed with patient. Educated on pain medication availability and possible side effects. Shift Summary: Patient has not complained of pain this shift. IV remains intact. Dressing to right knee remains CDI. TEDS and SCD compression device in place.

## 2018-03-20 NOTE — IP AVS SNAPSHOT
[address] [address] 
[phone] Patient: Raymon Mena MRN: [id number] RU:98/39/4106 About your hospitalization You were admitted on:  March 20, 2018 You last received care in the:  THE [facility] [address] You were discharged on:  March 21, 2018 Why you were hospitalized Your primary diagnosis was:  Osteoarthritis Of Right Knee Follow-up Information Follow up With Details Comments Contact Info Brenda Pelletier MD On 4/4/2018 Follow up appointment @ 1:45pm [address] [place] [address] [address] [facility] 
[phone] Ignacia Coe MD   [place] [address] [address] [place] [address] 
[phone] Discharge Orders None A check beverly indicates which time of day the medication should be taken. My Medications START taking these medications Instructions Each Dose to Equal  
 Morning Noon Evening Bedtime  
 aspirin 325 mg tablet Commonly known as:  ASPIRIN Your last dose was: Your next dose is: Take 1 Tab by mouth two (2) times a day for 21 days. 325 mg  
    
   
   
   
  
 meloxicam 7.5 mg tablet Commonly known as:  MOBIC Your last dose was: Your next dose is: Take 1 Tab by mouth two (2) times a day for 14 days. 7.5 mg  
    
   
   
   
  
 oxyCODONE-acetaminophen 5-325 mg per tablet Commonly known as:  PERCOCET Your last dose was: Your next dose is: Take 1-2 tablets by mouth every 4-6 hours as needed for pain. CONTINUE taking these medications Instructions Each Dose to Equal  
 Morning Noon Evening Bedtime  
 amLODIPine 10 mg tablet Commonly known as:  Nadja Connor Your last dose was: Your next dose is: Take 10 mg by mouth daily.   
 10 mg  
    
   
   
   
  
 losartan-hydroCHLOROthiazide 100-25 mg per tablet Commonly known as:  HYZAAR Your last dose was: Your next dose is: Take 1 Tab by mouth daily. 1 Tab  
    
   
   
   
  
 simvastatin 10 mg tablet Commonly known as:  ZOCOR Your last dose was: Your next dose is: Take 10 mg by mouth nightly. 10 mg  
    
   
   
   
  
  
STOP taking these medications   
 ibuprofen 400 mg tablet Commonly known as:  MOTRIN Where to Get Your Medications Information on where to get these meds will be given to you by the nurse or doctor. ! Ask your nurse or doctor about these medications  
  aspirin 325 mg tablet  
 meloxicam 7.5 mg tablet  
 oxyCODONE-acetaminophen 5-325 mg per tablet Discharge Instructions [address] [place] [address] Patient Discharge Instructions Doug Gerardoia / [id number] : 1940 Admitted 3/20/2018 Discharged: 3/20/2018 IF YOU HAVE ANY PROBLEMS ONCE YOU ARE AT HOME CALL THE FOLLOWING NUMBERS:  
Main office number: [phone] Your follow up appointment to see either Dr. Barry Barr PA-C, or [facility] ANGELITA as scheduled in 2 weeks. If you are unsure of your appointment date call the office at [phone]. Medication Instructions · Resume your home medictions as directed, you may have directed not to resume supplements until after your follow up. · A prescription for pain medication has been given · It is important that you take the medication exactly as they are prescribed. · Keep your medication in the bottles provided by the pharmacist and keep a list of the medication names, dosages, and times to be taken in your wallet. · Do not take other medications without consulting your doctor. What to do at [facility] Resume your prehospital diet. If you have excessive nausea or vomitting call your doctor's office. Be sure to maintain adequate fluid intake. Some pain medications may cause constipation. Remember to drink fluids, stay as active as possible, and eat plenty of fiber-rich foods. Begin In-Home Physical Therapy; 3 times a week to work on gait training, range of motion, strengthening, and weight bearing exercises as tolerable. Continue to use your walker or cane when walking. May progress from the walker to a cane to complete total bearing as tolerable. Patient may shower. Wrap incision with plastic wrap/covering to prevent incision from getting wet. Avoid complete immersion. YOUR DRESSING SHOULD BE CHANGED IN 3-5 DAYS BY [facility] NURSE. When to Call - Call if you have a temperature greater then 101 
- Unable to keep food down - Are unable to bear any wieght  
- Need a pain medication refill Information obtained by : 
I understand that if any problems occur once I am at home I am to contact my physician. I understand and acknowledge receipt of the instructions indicated above. Physician's or R.N.'s Signature                                                                  Date/Time Patient or Representative Signature                                                          Date/Time [facility] Announcement We are excited to announce that we are making your provider's discharge notes available to you in [employer]. You will see these notes when they are completed and signed by the physician that discharged you from your recent hospital stay.   If you have any questions or concerns about any information you see in [facility], please call the [employer] Department where you were seen or reach out to your Primary Care Provider for more information about your plan of care. Introducing [address]! Sasha Godwin introduces [employer] patient portal. Now you can access parts of your medical record, email your doctor's office, and request medication refills online. 1. In your internet browser, go to [email or web address] 2. Click on the First Time User? Click Here link in the Sign In box. You will see the New Member Sign Up page. 3. Enter your [employer] Access Code exactly as it appears below. You will not need to use this code after youve completed the sign-up process. If you do not sign up before the expiration date, you must request a new code. · [employer] Access Code: [email or web address] Expires: 5/23/2018 12:29 PM 
 
4. Enter the last four digits of your Social Security Number (xxxx) and Date of Birth (mm/dd/yyyy) as indicated and click Submit. You will be taken to the next sign-up page. 5. Create a [employer] ID. This will be your [employer] login ID and cannot be changed, so think of one that is secure and easy to remember. 6. Create a [employer] password. You can change your password at any time. 7. Enter your Password Reset Question and Answer. This can be used at a later time if you forget your password. 8. Enter your e-mail address. You will receive e-mail notification when new information is available in [address] 9. Click Sign Up. You can now view and download portions of your medical record. 10. Click the Download Summary menu link to download a portable copy of your medical information. If you have questions, please visit the Frequently Asked Questions section of the [employer] website. Remember, [employer] is NOT to be used for urgent needs. For medical emergencies, dial 911. Now available from your iPhone and Android! Providers Seen During Your Hospitalization Provider Specialty Primary office phone Ann-Marie Singh MD Orthopedic Surgery [phone] Your Primary Care Physician (PCP) Primary Care Physician Office Phone Office Fax [address] [place] [address] [phone] You are allergic to the following Allergen Reactions Shrimp Other (comments) Irritation on hands Recent Documentation Height Weight BMI Smoking Status 1.74 m 87.1 kg 28.79 kg/m2 Never Smoker Emergency Contacts Name Discharge Info Relation Home Work Mobile Herve Ballesteros(Son-N-Law) DISCHARGE CAREGIVER [3] Other Relative [6] [phone] Patient Belongings The following personal items are in your possession at time of discharge: 
  Dental Appliances: None  Visual Aid: Glasses      Home Medications: None   Jewelry: None  Clothing: Undergarments, Socks, Footwear, Shirt, Pants (with Baron Mckinley)    Other Valuables: Cell Phone, Dorothyann Reasons (with Herve) Please provide this summary of care documentation to your next provider. Signatures-by signing, you are acknowledging that this After Visit Summary has been reviewed with you and you have received a copy. Patient Signature:  ____________________________________________________________ Date:  ____________________________________________________________  
  
Shazia Snyder Provider Signature:  ____________________________________________________________ Date:  ____________________________________________________________

## 2018-03-20 NOTE — ANESTHESIA PREPROCEDURE EVALUATION
Anesthetic History   No history of anesthetic complications            Review of Systems / Medical History  Patient summary reviewed, nursing notes reviewed and pertinent labs reviewed    Pulmonary            Asthma : well controlled       Neuro/Psych   Within defined limits           Cardiovascular    Hypertension: well controlled                   GI/Hepatic/Renal  Within defined limits              Endo/Other        Arthritis     Other Findings              Physical Exam    Airway  Mallampati: II  TM Distance: 4 - 6 cm  Neck ROM: normal range of motion   Mouth opening: Normal     Cardiovascular    Rhythm: regular  Rate: normal         Dental    Dentition: Caps/crowns     Pulmonary  Breath sounds clear to auscultation               Abdominal  GI exam deferred       Other Findings            Anesthetic Plan    ASA: 2  Anesthesia type: general and regional  Adductor canal block        Induction: Intravenous  Anesthetic plan and risks discussed with: Family and Patient      R/B discussed including nerve injury.

## 2018-03-20 NOTE — PERIOP NOTES
Received patient from [place] [address] and anesthesia provider. Reviewed surgical procedure, intraoperative course. Patient identified.

## 2018-03-20 NOTE — ANESTHESIA PROCEDURE NOTES
Peripheral Block    Start time: 3/20/2018 10:23 AM  End time: 3/20/2018 10:27 AM  Performed by: Benito Marcum by: Wilfrido Riley       Pre-procedure: Indications: at surgeon's request, post-op pain management and procedure for pain    Preanesthetic Checklist: patient identified, risks and benefits discussed, site marked, timeout performed, anesthesia consent given and patient being monitored    Timeout Time: 10:23          Block Type:   Block Type:   Adductor canal  Laterality:  Right  Monitoring:  Standard ASA monitoring, continuous pulse ox, frequent vital sign checks, heart rate, responsive to questions and oxygen  Injection Technique:  Single shot  Procedures: ultrasound guided    Patient Position: supine  Prep: chlorhexidine    Location:  Mid thigh  Needle Type:  Stimuplex  Needle Gauge:  21 G  Needle Localization:  Ultrasound guidance  Medication Injected:  0.5%  ropivacaine  Volume (mL):  30    Assessment:  Number of attempts:  1  Injection Assessment:  Incremental injection every 5 mL, local visualized surrounding nerve on ultrasound, negative aspiration for blood, no intravascular symptoms, no paresthesia and ultrasound image on chart  Patient tolerance:  Patient tolerated the procedure well with no immediate complications

## 2018-03-20 NOTE — DISCHARGE SUMMARY
[address] [address]     DISCHARGE SUMMARY     PATIENT: Adolfo Jackson     MRN: [id number]   ADMIT DATE: 3/20/2018   [facility]   DISCHARGE DATE:      ATTENDING: Albert Schuster MD   DICTATING: SANA Novak         ADMISSION DIAGNOSIS: UNILATERAL PRIMARY OSTEOARTHRITIS, RIGHT KNEE  Osteoarthritis of right knee    DISCHARGE DIAGNOSIS: Status post RIGHT TOTAL KNEE ARTHROPLASTY    HISTORY OF PRESENT ILLNESS: The patient is a 68y.o. year-old male   with ongoing right knee pain secondary to osteoarthritis of his right knee. The patient's pain has persisted and progressed despite conservative treatments and therapies. The patient has at this time opted for surgical intervention. PAST MEDICAL HISTORY:   Past Medical History:   Diagnosis Date    Arthritis     knee, hand    Asthma     up to age 15    Hypertension 2004    Osteoarthritis of right knee 3/19/2018       PAST SURGICAL HISTORY: History reviewed. No pertinent surgical history. ALLERGIES:   Allergies   Allergen Reactions    Shrimp Other (comments)     Irritation on hands        CURRENT MEDICATIONS:  A list of medications prior to the time of admission include:  Prior to Admission medications    Medication Sig Start Date End Date Taking? Authorizing Provider   aspirin (ASPIRIN) 325 mg tablet Take 1 Tab by mouth two (2) times a day for 21 days. 3/20/18 4/10/18 Yes SANA Arndt   meloxicam (MOBIC) 7.5 mg tablet Take 1 Tab by mouth two (2) times a day for 14 days. 3/20/18 4/3/18 Yes SANA Arndt   oxyCODONE-acetaminophen (PERCOCET) 5-325 mg per tablet Take 1-2 tablets by mouth every 4-6 hours as needed for pain. 3/20/18  Yes SANA Arndt   amLODIPine (NORVASC) 10 mg tablet Take 10 mg by mouth daily. Yes Historical Provider   simvastatin (ZOCOR) 10 mg tablet Take 10 mg by mouth nightly.    Yes Historical Provider   losartan-hydroCHLOROthiazide (HYZAAR) 100-25 mg per tablet Take 1 Tab by mouth daily. Yes Historical Provider   ibuprofen (MOTRIN) 400 mg tablet Take  by mouth every six (6) hours as needed for Pain. Yes Historical Provider       FAMILY HISTORY: History reviewed. No pertinent family history. SOCIAL HISTORY:   Social History     Social History    Marital status:      Spouse name: N/A    Number of children: N/A    Years of education: N/A     Social History Main Topics    Smoking status: Never Smoker    Smokeless tobacco: Never Used    Alcohol use No    Drug use: No    Sexual activity: Not Asked     Other Topics Concern    None     Social History Narrative       REVIEW OF SYSTEMS: All review of systems are negative. PHYSICAL EXAMINATION: For a detailed physical exam, please refer to the patient's chart. HOSPITAL COURSE: The patient was taken to surgery the day of admission. he underwent a right total knee replacement. Operative course was benign. Estimated blood loss was approximately 150 cc. The patient was taken to the PACU in stable condition and was later taken to the floor in stable condition. During his hospital stay, the patient progressed well with physical therapy and occupational therapy, adherent to instructions. he had been cleared by physical therapy with stair training. he was placed on Aspirin for DVT prophylaxis. his pain has been well controlled with oral pain medications. his vitals have remained stable. he has also remained hemodynamically stable. The patient has been recommended for discharge home. DISCHARGE INSTRUCTIONS: The patient is to be discharged home. he is to continue on his prior medications per the medication reconciliation form, to which we will add:  1. Aspirin 81 mg; 1 tablet po bid x 21 days  2. Mobic 7.5 mg; 1 tablet po bid x 14 days  3.  Percocet 5/325 mg; 1-2 tablets p.o. every 4 to 6 hours p.r.n. for pain    The patient is to continue at home with home physical therapy 3 times a week to work on gait training, range of motion, strengthening, and weightbearing exercises as tolerated on his right lower extremity. The patient is to progress from a walker to a cane to complete total weightbearing as tolerable. The patient is to continue to keep his incision dry. The patient is to followup with Dr. Lance Lam, Tamia Earl PA-C and/or Jillian Chaves PA-C in the office approximately 10-14 days status post for x-rays and further evaluation.     SANA Teixeira  3/20/2018

## 2018-03-20 NOTE — ROUTINE PROCESS
Bedside and Verbal shift change report given to [address] by Bo Waddell RN.  Report included the following information SBAR, Kardex, OR Summary, Intake/Output and MAR

## 2018-03-20 NOTE — ANESTHESIA POSTPROCEDURE EVALUATION
.  Post-Anesthesia Evaluation & Assessment    Visit Vitals    /58    Pulse 62    Temp 36.3 °C (97.4 °F)    Resp 15    Ht 5' 8.5\" (1.74 m)    Wt 87.1 kg (192 lb 2 oz)    SpO2 100%    BMI 28.79 kg/m2       Nausea/Vomiting: no nausea    Post-operative hydration adequate.     Pain score (VAS): 2    Mental status & Level of consciousness: alert and oriented x 3    Neurological status: moves all extremities, sensation grossly intact    Pulmonary status: airway patent, no supplemental oxygen required    Complications related to anesthesia: none    Additional comments:

## 2018-03-20 NOTE — PERIOP NOTES
Pt. Used restroom in pre-op area with assistance. Patient placed on Ashwin Paws for a minimum of 30 min in  Preop.

## 2018-03-20 NOTE — INTERVAL H&P NOTE
H&P Update:  Tiffanie Meadows was seen and examined. History and physical has been reviewed. The patient has been examined.  There have been no significant clinical changes since the completion of the originally dated History and Physical.    Signed By: Ny Connor MD     March 20, 2018 10:50 AM

## 2018-03-20 NOTE — PROGRESS NOTES
Problem: Mobility Impaired (Adult and Pediatric)  Goal: *Acute Goals and Plan of Care (Insert Text)  Goals to be addressed in 1-4 days:  STG  1. Rolling L to R to L modified independent for positioning. 2. Supine to sit to supine S with HR for meals. 3. Sit to stand to sit S with RW in prep for ambulation. LTG  1. Ambulate >150ft S with RW, WBAT, for home/community mobility. 2. Ascend/descend a >3 stair steps CGA with HR for home entry. 3. Tolerate up in chair 1-2 hours for ADLs. 4. Patient/family to be independent with HEP for follow-up care and safe discharge. Outcome: Progressing Towards Goal  physical Therapy EVALUATION    Patient: Nicolle Hampton (87 y.o. male)  Date: 3/20/2018  Primary Diagnosis: UNILATERAL PRIMARY OSTEOARTHRITIS, RIGHT KNEE  Osteoarthritis of right knee  Procedure(s) (LRB):  RIGHT TOTAL KNEE REPLACEMENT (Right) Day of Surgery   Precautions:   Fall, WBAT    ASSESSMENT :  Based on the objective data described below, the patient presents with lower extremity weakness, decreased gait quality and endurance, decreased R knee ROM/flexibility, and overall limitations in functional mobility s/p R TKR. Pt performed sit to stand with CGA. Patient ambulated 100 feet with RW, GB applied, CGA, WBAT. Pt tolerated session well as evidenced by no c/o increased pain, no lightheadedness or dizziness, SPO2 >95% on RA. Patient would benefit from skilled inpatient physical therapy to address deficits, progress as tolerated to achieve long term goals and allow safe discharge. Patient will benefit from skilled intervention to address the above impairments.   Patients rehabilitation potential is considered to be Good  Factors which may influence rehabilitation potential include:   []         None noted  []         Mental ability/status  [x]         Medical condition  []         Home/family situation and support systems  []         Safety awareness  [x]         Pain tolerance/management  []         Other: PLAN :  Recommendations and Planned Interventions:  [x]           Bed Mobility Training             [x]    Neuromuscular Re-Education  [x]           Transfer Training                   []    Orthotic/Prosthetic Training  [x]           Gait Training                          []    Modalities  [x]           Therapeutic Exercises          []    Edema Management/Control  [x]           Therapeutic Activities            [x]    Patient and Family Training/Education  []           Other (comment):    Frequency/Duration: Patient will be followed by physical therapy twice daily to address goals. Discharge Recommendations: Home Health  Further Equipment Recommendations for Discharge: N/A     SUBJECTIVE:   Patient stated I am doing great.     OBJECTIVE DATA SUMMARY:     Past Medical History:   Diagnosis Date    Arthritis     knee, hand    Asthma     up to age 15    Hypertension 2004    Osteoarthritis of right knee 3/19/2018   History reviewed. No pertinent surgical history. Barriers to Learning/Limitations: None  Compensate with: N/A  Prior Level of Function/Home Situation: Independent amb s/AD  Home Situation  Home Environment: Private residence  # Steps to Enter: 3  Rails to Enter: Yes  Hand Rails : Bilateral  One/Two Story Residence: Two story, live on 1st floor  Living Alone: No  Support Systems: Spouse/Significant Other/Partner, Child(zheng)  Patient Expects to be Discharged to[de-identified] Private residence  Current DME Used/Available at Home: Walker, rolling  Critical Behavior:  Neurologic State: Alert; Appropriate for age  Orientation Level: Appropriate for age;Oriented X4  Cognition: Appropriate decision making; Appropriate for age attention/concentration; Appropriate safety awareness  Psychosocial  Purposeful Interaction: Yes  Pt Identified Daily Priority: Clinical issues (comment)  Caring Interventions: Reassure; Therapeutic modalities  Reassure: Therapeutic listening; Informing  Therapeutic Modalities: Deep breathing; Intentional therapeutic touch  Skin Condition/Temp: Dry;Warm  Skin Integrity: Incision (comment) (right knee)  Skin Integumentary  Skin Color: Appropriate for ethnicity  Skin Condition/Temp: Dry;Warm  Skin Integrity: Incision (comment) (right knee)  Turgor: Non-tenting  Hair Growth: Present  Varicosities: Absent   Strength:    Strength: Generally decreased, functional  Tone & Sensation:   Tone: Normal  Sensation: Intact  Range Of Motion:  AROM: Generally decreased, functional  PROM: Generally decreased, functional  Functional Mobility:  Transfers:  Sit to Stand: Contact guard assistance  Stand to Sit: Contact guard assistance  Balance:   Sitting: Intact  Standing: Intact; With support  Ambulation/Gait Training:  Distance (ft): 100 Feet (ft)  Assistive Device: Gait belt;Walker, rolling  Ambulation - Level of Assistance: Contact guard assistance  Gait Description (WDL): Exceptions to WDL  Gait Abnormalities: Decreased step clearance; Antalgic  Right Side Weight Bearing: As tolerated  Base of Support: Shift to left  Stance: Right decreased  Speed/Arlene: Slow  Step Length: Right shortened;Left shortened  Swing Pattern: Left asymmetrical;Right asymmetrical  Pain:  Pain Scale 1: Numeric (0 - 10)  Pain Intensity 1: 3  Pain Location 1: Knee  Pain Orientation 1: Right  Pain Description 1: Aching  Pain Intervention(s) 1: Declines; Ice  Activity Tolerance:   Good  Please refer to the flowsheet for vital signs taken during this treatment. After treatment:   []         Patient left in no apparent distress sitting up in chair  [x]         Patient left in no apparent distress in bed  [x]         Call bell left within reach  [x]         Nursing notified  []         Caregiver present  []         Bed alarm activated    COMMUNICATION/EDUCATION:   [x]         Fall prevention education was provided and the patient/caregiver indicated understanding.   [x]         Patient/family have participated as able in goal setting and plan of care. [x]         Patient/family agree to work toward stated goals and plan of care. []         Patient understands intent and goals of therapy, but is neutral about his/her participation. []         Patient is unable to participate in goal setting and plan of care. Thank you for this referral.  Fabiola John   Time Calculation: 27 mins   Eval Complexity: History: MEDIUM  Complexity : 1-2 comorbidities / personal factors will impact the outcome/ POC Exam:LOW Complexity : 1-2 Standardized tests and measures addressing body structure, function, activity limitation and / or participation in recreation  Presentation: LOW Complexity : Stable, uncomplicated  Clinical Decision Making:Low Complexity amb >30 ft c/RW, CGA Overall Complexity:LOW  Mobility  Current  CJ= 20-39%   Goal  CI= 1-19%. The severity rating is based on the Level of Assistance required for Functional Mobility and ADLs.

## 2018-03-20 NOTE — ROUTINE PROCESS
TRANSFER - IN REPORT:    Verbal report received from Lorraine Whittington RN(name) on Doug Qualia  being received from [facility]) for routine post - op      Report consisted of patients Situation, Background, Assessment and   Recommendations(SBAR). Information from the following report(s) SBAR, Kardex, ED Summary, OR Summary, Procedure Summary, Intake/Output, Accordion, Recent Results and Med Rec Status was reviewed with the receiving nurse. Opportunity for questions and clarification was provided. Assessment completed upon patients arrival to unit and care assumed.

## 2018-03-21 ENCOUNTER — HOME HEALTH ADMISSION (OUTPATIENT)
Dept: HOME HEALTH SERVICES | Facility: HOME HEALTH | Age: 78
End: 2018-03-21
Payer: MEDICARE

## 2018-03-21 VITALS
OXYGEN SATURATION: 98 % | BODY MASS INDEX: 28.46 KG/M2 | SYSTOLIC BLOOD PRESSURE: 124 MMHG | TEMPERATURE: 99 F | WEIGHT: 192.13 LBS | RESPIRATION RATE: 16 BRPM | HEART RATE: 52 BPM | HEIGHT: 69 IN | DIASTOLIC BLOOD PRESSURE: 71 MMHG

## 2018-03-21 LAB
ANION GAP SERPL CALC-SCNC: 7 MMOL/L (ref 3–18)
BUN SERPL-MCNC: 11 MG/DL (ref 7–18)
BUN/CREAT SERPL: 14 (ref 12–20)
CALCIUM SERPL-MCNC: 7.8 MG/DL (ref 8.5–10.1)
CHLORIDE SERPL-SCNC: 105 MMOL/L (ref 100–108)
CO2 SERPL-SCNC: 27 MMOL/L (ref 21–32)
CREAT SERPL-MCNC: 0.81 MG/DL (ref 0.6–1.3)
ERYTHROCYTE [DISTWIDTH] IN BLOOD BY AUTOMATED COUNT: 12.9 % (ref 11.6–14.5)
GLUCOSE SERPL-MCNC: 118 MG/DL (ref 74–99)
HCT VFR BLD AUTO: 32.9 % (ref 36–48)
HGB BLD-MCNC: 11.3 G/DL (ref 13–16)
MCH RBC QN AUTO: 31.8 PG (ref 24–34)
MCHC RBC AUTO-ENTMCNC: 34.3 G/DL (ref 31–37)
MCV RBC AUTO: 92.7 FL (ref 74–97)
PLATELET # BLD AUTO: 195 K/UL (ref 135–420)
PMV BLD AUTO: 10.6 FL (ref 9.2–11.8)
POTASSIUM SERPL-SCNC: 3 MMOL/L (ref 3.5–5.5)
RBC # BLD AUTO: 3.55 M/UL (ref 4.7–5.5)
SODIUM SERPL-SCNC: 139 MMOL/L (ref 136–145)
WBC # BLD AUTO: 14.9 K/UL (ref 4.6–13.2)

## 2018-03-21 PROCEDURE — 74011250637 HC RX REV CODE- 250/637: Performed by: PHYSICIAN ASSISTANT

## 2018-03-21 PROCEDURE — 74011250637 HC RX REV CODE- 250/637: Performed by: ORTHOPAEDIC SURGERY

## 2018-03-21 PROCEDURE — 74011250636 HC RX REV CODE- 250/636: Performed by: PHYSICIAN ASSISTANT

## 2018-03-21 PROCEDURE — 85027 COMPLETE CBC AUTOMATED: CPT | Performed by: PHYSICIAN ASSISTANT

## 2018-03-21 PROCEDURE — 97110 THERAPEUTIC EXERCISES: CPT

## 2018-03-21 PROCEDURE — 80048 BASIC METABOLIC PNL TOTAL CA: CPT | Performed by: PHYSICIAN ASSISTANT

## 2018-03-21 PROCEDURE — 36415 COLL VENOUS BLD VENIPUNCTURE: CPT | Performed by: PHYSICIAN ASSISTANT

## 2018-03-21 PROCEDURE — 97530 THERAPEUTIC ACTIVITIES: CPT

## 2018-03-21 PROCEDURE — 97116 GAIT TRAINING THERAPY: CPT

## 2018-03-21 RX ORDER — POLYETHYLENE GLYCOL 3350 17 G/17G
17 POWDER, FOR SOLUTION ORAL DAILY
Status: DISCONTINUED | OUTPATIENT
Start: 2018-03-21 | End: 2018-03-21 | Stop reason: HOSPADM

## 2018-03-21 RX ORDER — POTASSIUM CHLORIDE 20 MEQ/1
40 TABLET, EXTENDED RELEASE ORAL
Status: COMPLETED | OUTPATIENT
Start: 2018-03-21 | End: 2018-03-21

## 2018-03-21 RX ADMIN — KETOROLAC TROMETHAMINE 15 MG: 15 INJECTION, SOLUTION INTRAMUSCULAR; INTRAVENOUS at 06:09

## 2018-03-21 RX ADMIN — ASPIRIN 81 MG: 81 TABLET, COATED ORAL at 09:01

## 2018-03-21 RX ADMIN — POLYETHYLENE GLYCOL 3350 17 G: 17 POWDER, FOR SOLUTION ORAL at 09:12

## 2018-03-21 RX ADMIN — AMLODIPINE BESYLATE 10 MG: 5 TABLET ORAL at 09:04

## 2018-03-21 RX ADMIN — DOCUSATE SODIUM 100 MG: 100 CAPSULE, LIQUID FILLED ORAL at 09:01

## 2018-03-21 RX ADMIN — ACETAMINOPHEN 650 MG: 325 TABLET ORAL at 06:09

## 2018-03-21 RX ADMIN — POTASSIUM CHLORIDE 40 MEQ: 20 TABLET, EXTENDED RELEASE ORAL at 09:01

## 2018-03-21 RX ADMIN — FERROUS SULFATE TAB 325 MG (65 MG ELEMENTAL FE) 325 MG: 325 (65 FE) TAB at 09:01

## 2018-03-21 RX ADMIN — HYDROCHLOROTHIAZIDE: 25 TABLET ORAL at 09:04

## 2018-03-21 RX ADMIN — PANTOPRAZOLE SODIUM 40 MG: 40 TABLET, DELAYED RELEASE ORAL at 09:01

## 2018-03-21 RX ADMIN — MELOXICAM 7.5 MG: 7.5 TABLET ORAL at 09:01

## 2018-03-21 RX ADMIN — CEFAZOLIN SODIUM 2 G: 2 SOLUTION INTRAVENOUS at 03:40

## 2018-03-21 NOTE — PROGRESS NOTES
Chart reviewed, pt has cleared PT for discharge home. Spoke with pt, son is taking him home, he has assistance once home. FOC offered, pt chose [facility]  for follow up; referral placed with [facility]. Pt has RW for home. Care Management Interventions  PCP Verified by CM:  Yes  Transition of Care Consult (CM Consult): [address]: Yes  Discharge Durable Medical Equipment: No  Physical Therapy Consult: Yes  Occupational Therapy Consult: Yes  Current Support Network: Own Home  Confirm Follow Up Transport: Family  Plan discussed with Pt/Family/Caregiver: Yes  Freedom of Choice Offered: Yes  Discharge Location  Discharge Placement: Home with home health

## 2018-03-21 NOTE — PROGRESS NOTES
Progress Note        Patient: Nicolle Hampton MRN: [id number]  SSN: [id number]    YOB: 1940  Age: 68 y.o. Sex: male      1 Day Post-Op status post Procedure(s) (LRB):  RIGHT TOTAL KNEE REPLACEMENT (Right)    Admit Date: 3/20/2018  Admit Diagnosis: UNILATERAL PRIMARY OSTEOARTHRITIS, RIGHT KNEE  Osteoarthritis of right knee    Subjective:      Doing well. No complaints. No SOB. No Chest Pain. No Nausea or Vomiting. No problems eating or voiding. Objective:        Temp (24hrs), Av °F (36.7 °C), Min:97.4 °F (36.3 °C), Max:99.4 °F (37.4 °C)    Body mass index is 28.79 kg/(m^2). Patient Vitals for the past 12 hrs:   BP Temp Pulse Resp SpO2   18 0628 124/71 99 °F (37.2 °C) (!) 52 16 98 %   18 0330 129/54 99.4 °F (37.4 °C) 73 16 95 %   18 2247 128/71 98.6 °F (37 °C) 69 16 98 %     Recent Labs      18   0335   HGB  11.3*   HCT  32.9*   NA  139   K  3.0*   CL  105   CO2  27   BUN  11   CREA  0.81   GLU  118*       Physical Exam:  Vital Signs are Stable. No Acute Distress. Alert and Oriented. Negative Homans sign. Toes AROM Full. Neurovascular exam is normal.    Dressing is Clean, Dry, and Intact. Assessment/Plan:     1. Continue oob/rehab  2.  D/c planning    Continue PT/OT  Continue CPM/ROM    Discharge Plan: Home    Signed By: Christofer Melo MD     2018

## 2018-03-21 NOTE — ROUTINE PROCESS
Bedside and Verbal shift change report given to Ernest Nissen , RN by Robyn Strickland RN.  Report included the following information SBAR, Kardex, OR Summary, Intake/Output and MAR

## 2018-03-21 NOTE — DISCHARGE INSTRUCTIONS
[address] [place] [address] Sports Medicine   Patient Discharge Instructions    Mansoor Spears / [id number] : 1940    Admitted 3/20/2018 Discharged: 3/20/2018     IF YOU HAVE ANY PROBLEMS ONCE YOU ARE AT HOME CALL THE FOLLOWING NUMBERS:   Main office number: [phone]    Your follow up appointment to see either Dr. Sameera Mckeon PA-C, or Zoe Joshua PA-C as scheduled in 2 weeks. If you are unsure of your appointment date call the office at [phone]. Medication Instructions     · Resume your home medictions as directed, you may have directed not to resume supplements until after your follow up. · A prescription for pain medication has been given   · It is important that you take the medication exactly as they are prescribed. · Keep your medication in the bottles provided by the pharmacist and keep a list of the medication names, dosages, and times to be taken in your wallet. · Do not take other medications without consulting your doctor. What to do at [address] [place] [address] Ave your prehospital diet. If you have excessive nausea or vomitting call your doctor's office. Be sure to maintain adequate fluid intake. Some pain medications may cause constipation. Remember to drink fluids, stay as active as possible, and eat plenty of fiber-rich foods. Begin In-Home Physical Therapy; 3 times a week to work on gait training, range of motion, strengthening, and weight bearing exercises as tolerable. Continue to use your walker or cane when walking. May progress from the walker to a cane to complete total bearing as tolerable. Patient may shower. Wrap incision with plastic wrap/covering to prevent incision from getting wet. Avoid complete immersion. YOUR DRESSING SHOULD BE CHANGED IN 3-5 DAYS BY [facility] NURSE.       When to Call    - Call if you have a temperature greater then 101  - Unable to keep food down  - Are unable to bear any wieght   - Need a pain medication refill     Information obtained by :  I understand that if any problems occur once I am at home I am to contact my physician. I understand and acknowledge receipt of the instructions indicated above.                                                                                                                                            Physician's or R.N.'s Signature                                                                  Date/Time                                                                                                                                              Patient or Representative Signature                                                          Date/Time

## 2018-03-21 NOTE — PROGRESS NOTES
Problem: Mobility Impaired (Adult and Pediatric)  Goal: *Acute Goals and Plan of Care (Insert Text)  Goals to be addressed in 1-4 days:  STG  1. Rolling L to R to L modified independent for positioning. 2. Supine to sit to supine S with HR for meals. 3. Sit to stand to sit S with RW in prep for ambulation. LTG  1. Ambulate >150ft S with RW, WBAT, for home/community mobility. 2. Ascend/descend a >3 stair steps CGA with HR for home entry. 3. Tolerate up in chair 1-2 hours for ADLs. 4. Patient/family to be independent with HEP for follow-up care and safe discharge. Outcome: Resolved/Met Date Met: 03/21/18  physical Therapy TREATMENT/DISCHARGE    Patient: Adolfo Jackson (89 y.o. male)  Date: 3/21/2018  Diagnosis: UNILATERAL PRIMARY OSTEOARTHRITIS, RIGHT KNEE  Osteoarthritis of right knee Osteoarthritis of right knee  Procedure(s) (LRB):  RIGHT TOTAL KNEE REPLACEMENT (Right) 1 Day Post-Op  Precautions: Fall, WBAT  Chart, physical therapy assessment, plan of care and goals were reviewed. ASSESSMENT:  Patient is cleared for discharge from PT and has met all goals. Patient reported 0/10 pain level on entry. Patient's son present during session. Patient is S with bed mobility and at S level for sit<>supine and sit<>stand. PT ambulated 350' with RW and  antalgic pattern with good balance but with cues to slow gait speed at times, especially with turns. Up/down 4 steps with left rail and SBA. Pt performed TKA ex as documented below and verbalized understanding of home safety information. AROM right knee 22 to 97 degrees. PT left up in chair, ice to right knee, needs in reach. Nurse Leanna aware of pt status. Recommend HHPT at discharge. Pt has RW for safe home ambulation.     Progression toward goals:  [x]      Goals met  []      Improving appropriately and progressing toward goals  []      Improving slowly and progressing toward goals  []      Not making progress toward goals and plan of care will be adjusted PLAN:  Patient will be discharged from physical therapy at this time. Rationale for discharge:  [x] Goals Achieved  [] [address]  [] Patient not participating in therapy  [] Other:  Discharge Recommendations:  Home Health  Further Equipment Recommendations for Discharge:  N/A     SUBJECTIVE:   Patient stated I am excellent.     OBJECTIVE DATA SUMMARY:   Critical Behavior:  Neurologic State: Alert, Appropriate for age  Orientation Level: Oriented X4  Cognition: Follows commands  Functional Mobility Training:  Bed Mobility:  Supine to Sit: Supervision  Sit to Supine: Supervision  Scooting: Supervision  Transfers:  Sit to Stand: Supervision  Stand to Sit: Supervision  Balance:  Sitting: Intact  Standing: Intact; With support  Ambulation/Gait Training:  Distance (ft): 350 Feet (ft)  Assistive Device: Walker, rolling;Gait belt  Ambulation - Level of Assistance: Supervision  Gait Abnormalities: Antalgic;Decreased step clearance  Right Side Weight Bearing: As tolerated  Base of Support: Shift to left  Stance: Right decreased  Speed/Arlene: Pace decreased (<100 feet/min); Fluctuations  Step Length: Right shortened;Left shortened  Swing Pattern: Right asymmetrical  Interventions: Visual/Demos; Verbal cues; Safety awareness training    Stairs:  Number of Stairs Trained: 4  Stairs - Level of Assistance: Stand-by assistance   Rail Use: Left     Therapeutic Exercises:   Patient performed 5 reps of TKA exercises per protocol for right lower extremity(s), including supine ankle pumps, ankle circles quad sets, hamstringsets, heel slides, straight leg raises, short arc quads/ terminal knee extension, seated heel slides/ knee flexion exercises, long arc quad exercise with S/cues. Pain:  Pain Scale 1: Numeric (0 - 10)  Pain Intensity 1: 0  Activity Tolerance:   good  Please refer to the flowsheet for vital signs taken during this treatment.   After treatment:   [x] Patient left in no apparent distress sitting up in chair  [] Patient left in no apparent distress in bed  [x] Call bell left within reach  [x] Nursing notified  [x] Caregiver present  [] Bed alarm activated  Mobility  D/C  CI= 1-19%. The severity rating is based on the Level of Assistance required for Functional Mobility and ADLs.       Buck Butler, PT   Time Calculation: 38 mins

## 2018-03-21 NOTE — PROGRESS NOTES
Problem: Falls - Risk of  Goal: *Absence of Falls  Document Jackie Fall Risk and appropriate interventions in the flowsheet.    Outcome: Progressing Towards Goal  Fall Risk Interventions:  Mobility Interventions: Patient to call before getting OOB         Medication Interventions: Patient to call before getting OOB    Elimination Interventions: Call light in reach

## 2018-03-22 ENCOUNTER — HOME CARE VISIT (OUTPATIENT)
Dept: SCHEDULING | Facility: HOME HEALTH | Age: 78
End: 2018-03-22
Payer: MEDICARE

## 2018-03-22 VITALS
SYSTOLIC BLOOD PRESSURE: 112 MMHG | HEART RATE: 89 BPM | RESPIRATION RATE: 18 BRPM | TEMPERATURE: 98.6 F | BODY MASS INDEX: 27.2 KG/M2 | HEIGHT: 70 IN | DIASTOLIC BLOOD PRESSURE: 78 MMHG | OXYGEN SATURATION: 98 % | WEIGHT: 190 LBS

## 2018-03-22 PROCEDURE — G0299 HHS/HOSPICE OF RN EA 15 MIN: HCPCS

## 2018-03-22 PROCEDURE — G0151 HHCP-SERV OF PT,EA 15 MIN: HCPCS

## 2018-03-22 PROCEDURE — 400013 HH SOC

## 2018-03-22 NOTE — PROGRESS NOTES
6783 - Patient in chair at this time. A/O x 4. IV to right forearm  intact and patent. Plexis to bilateral feet and TEDs to left leg. Ace wrap dressing to right leg CDI. No numbness/tingling. Pedal pulses palpable. Lungs CTA. Bowel sounds active to all quadrants. Pain 0/10.     0950-Ace wrap removed from right leg and JAGJIT stocking placed. IV access removed. Dual AVS reviewed with Keara TORRES. All medications reviewed individually with patient. Opportunities for questions and concerns provided. Patient discharged via (mode of transport ie. Car, ambulance or air transport) car. Patient's arm band appropriately discarded.

## 2018-03-22 NOTE — ROUTINE PROCESS
Bedside and Verbal shift change report given to MICHELINE Terry RN (oncoming nurse) by Merilynn Bran. Lennox Cooper RN (offgoing nurse). Report included the following information SBAR, Kardex, Intake/Output and MAR.

## 2018-03-23 VITALS — RESPIRATION RATE: 16 BRPM

## 2018-03-25 ENCOUNTER — HOME CARE VISIT (OUTPATIENT)
Dept: SCHEDULING | Facility: HOME HEALTH | Age: 78
End: 2018-03-25
Payer: MEDICARE

## 2018-03-25 VITALS
TEMPERATURE: 99.1 F | RESPIRATION RATE: 18 BRPM | HEART RATE: 61 BPM | OXYGEN SATURATION: 91 % | DIASTOLIC BLOOD PRESSURE: 75 MMHG | SYSTOLIC BLOOD PRESSURE: 125 MMHG

## 2018-03-25 PROCEDURE — G0299 HHS/HOSPICE OF RN EA 15 MIN: HCPCS

## 2018-03-26 ENCOUNTER — HOME CARE VISIT (OUTPATIENT)
Dept: SCHEDULING | Facility: HOME HEALTH | Age: 78
End: 2018-03-26
Payer: MEDICARE

## 2018-03-26 VITALS
SYSTOLIC BLOOD PRESSURE: 140 MMHG | RESPIRATION RATE: 16 BRPM | OXYGEN SATURATION: 98 % | TEMPERATURE: 97.8 F | DIASTOLIC BLOOD PRESSURE: 80 MMHG | HEART RATE: 68 BPM

## 2018-03-26 PROCEDURE — G0151 HHCP-SERV OF PT,EA 15 MIN: HCPCS

## 2018-03-28 ENCOUNTER — HOME CARE VISIT (OUTPATIENT)
Dept: SCHEDULING | Facility: HOME HEALTH | Age: 78
End: 2018-03-28
Payer: MEDICARE

## 2018-03-28 PROCEDURE — G0151 HHCP-SERV OF PT,EA 15 MIN: HCPCS

## 2018-03-29 VITALS
SYSTOLIC BLOOD PRESSURE: 123 MMHG | TEMPERATURE: 97.8 F | RESPIRATION RATE: 16 BRPM | DIASTOLIC BLOOD PRESSURE: 80 MMHG | HEART RATE: 88 BPM | OXYGEN SATURATION: 97 %

## 2018-03-30 ENCOUNTER — HOME CARE VISIT (OUTPATIENT)
Dept: SCHEDULING | Facility: HOME HEALTH | Age: 78
End: 2018-03-30
Payer: MEDICARE

## 2018-03-30 VITALS
DIASTOLIC BLOOD PRESSURE: 78 MMHG | TEMPERATURE: 97.8 F | HEART RATE: 80 BPM | SYSTOLIC BLOOD PRESSURE: 130 MMHG | OXYGEN SATURATION: 96 % | RESPIRATION RATE: 16 BRPM

## 2018-03-30 PROCEDURE — G0151 HHCP-SERV OF PT,EA 15 MIN: HCPCS

## 2018-04-02 ENCOUNTER — HOME CARE VISIT (OUTPATIENT)
Dept: SCHEDULING | Facility: HOME HEALTH | Age: 78
End: 2018-04-02
Payer: MEDICARE

## 2018-04-02 VITALS
SYSTOLIC BLOOD PRESSURE: 140 MMHG | DIASTOLIC BLOOD PRESSURE: 70 MMHG | TEMPERATURE: 98 F | RESPIRATION RATE: 16 BRPM | OXYGEN SATURATION: 96 % | HEART RATE: 84 BPM

## 2018-04-02 PROCEDURE — G0151 HHCP-SERV OF PT,EA 15 MIN: HCPCS

## 2018-04-05 ENCOUNTER — HOME CARE VISIT (OUTPATIENT)
Dept: SCHEDULING | Facility: HOME HEALTH | Age: 78
End: 2018-04-05
Payer: MEDICARE

## 2018-04-05 VITALS
HEART RATE: 70 BPM | SYSTOLIC BLOOD PRESSURE: 120 MMHG | TEMPERATURE: 98 F | OXYGEN SATURATION: 98 % | RESPIRATION RATE: 16 BRPM | DIASTOLIC BLOOD PRESSURE: 80 MMHG

## 2018-04-05 PROCEDURE — G0151 HHCP-SERV OF PT,EA 15 MIN: HCPCS

## 2018-04-11 ENCOUNTER — HOSPITAL ENCOUNTER (OUTPATIENT)
Dept: PHYSICAL THERAPY | Age: 78
Discharge: HOME | End: 2018-04-11
Payer: MEDICARE

## 2018-04-11 PROCEDURE — 97161 PT EVAL LOW COMPLEX 20 MIN: CPT

## 2018-04-11 PROCEDURE — G8978 MOBILITY CURRENT STATUS: HCPCS

## 2018-04-11 PROCEDURE — 97110 THERAPEUTIC EXERCISES: CPT

## 2018-04-11 PROCEDURE — G8979 MOBILITY GOAL STATUS: HCPCS

## 2018-04-11 PROCEDURE — 97016 VASOPNEUMATIC DEVICE THERAPY: CPT

## 2018-04-11 NOTE — PROGRESS NOTES
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 Assessment/ key information: Pt is a 68year old male who is referred to Physical Therapy by Dr. Julia Dean s/p right total knee replacement, 2018. Pt demonstrates significant swelling in right LE that has pt states has been present since surgery. Stiffness noted in right knee, flexion and extension- pt ambulates with antalgic gait. Patient will benefit from skilled PT services to modify and progress therapeutic interventions, address functional mobility deficits, address ROM deficits, address strength deficits, analyze and address soft tissue restrictions, analyze and cue movement patterns, analyze and modify body mechanics/ergonomics, assess and modify postural abnormalities and address imbalance to attain pt/PT goals. Evaluation Complexity History MEDIUM  Complexity : 1-2 comorbidities / personal factors will impact the outcome/ POC ; Examination HIGH Complexity : 4+ Standardized tests and measures addressing body structure, function, activity limitation and / or participation in recreation  ;Presentation LOW Complexity : Stable, uncomplicated  ;Clinical Decision Making: MEDIUM;  Overall Complexity Rating: LOW     Problem List: pain affecting function, decrease ROM, decrease strength, edema affecting function, impaired gait/ balance, decrease ADL/ functional abilitiies, decrease activity tolerance, decrease flexibility/ joint mobility and decrease transfer abilities   Treatment Plan may include any combination of the following: Therapeutic exercise, Therapeutic activities, Neuromuscular re-education, Physical agent/modality, Gait/balance training, Manual therapy and Patient education  Patient / Family readiness to learn indicated by: asking questions, trying to perform skills and interest  Persons(s) to be included in education: patient (P)  Barriers to Learning/Limitations: None  Patient Goal (s): Competitive tennis by 2019.   Patient Self Reported Health Status: good  Rehabilitation Potential: good    Short Term Goals: To be accomplished in 3 weeks:  1) Pt will be Independent with HEP . 2) Pt will demonstrate knee extension >/= to -8 degrees ext to aid in ambulation. 3) Pt will demonstrate knee flexion >/= to 110 to aid in sitting. Long Term Goals: To be accomplished in 6 weeks:  1) Pt will be able to navigate a flight of stairs without pain. 2) Pt will be able to ambulate on uneven terrain without pain or instability. 3) Pt will demonstrate knee extension = 0 degrees to aid in ambulation. 4) Pt will demonstrate knee flexion >/= 115 degrees to aid in sitting/squatting. Frequency / Duration: Patient to be seen 2-3 times per week for 6 weeks. Patient/ Caregiver education and instruction: self care, activity modification and exercises    [x]  Plan of care has been reviewed with PTA    G-Codes (GP)  Mobility   Current  CK= 40-59%   Goal  CI= 1-19%    The severity rating is based on clinical judgment.     Certification Period: 04/11/2018-07/11/2018  Tyson Rodriguez PT, DPT   4/11/2018 3:36 PM    ________________________________________________________________________    I certify that the above Therapy Services are being furnished while the patient is under my care. I agree with the treatment plan and certify that this therapy is necessary.     [de-identified] Signature:____________________  Date:____________Time: _________

## 2018-04-11 NOTE — PROGRESS NOTES
PT INITIAL EVALUATION NOTE - [facility] 2-15    Patient Name: Lillie Meza  Date:2018  : 1940  [x]  Patient  Verified  Payor: [employer] MEDICARE / Plan: [address] / Product Type: Managed Care Medicare /    In time:12:45pm  Out time:1:30pm  Total Treatment Time (min): 45  Total Timed Codes (min): 10  1:1 Treatment Time ( [address] only): 10   Visit #: 1     Treatment Area: Right knee pain [M25.561]    SUBJECTIVE  Pain Level (0-10 scale): 1/10  Any medication changes, allergies to medications, adverse drug reactions, diagnosis change, or new procedure performed?: [] No    [x] Yes (see summary sheet for update)  Subjective:    Pt underwent right total knee replacement on 2018- pt spent one night in the hospital then returned home. 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 Pt received home health PT x 8 visits. Pt complains of stiffness in right knee but overall, feels that right knee is better than left knee, which is also arthritic and may need TKR in the future. Pt is still take oxycodone at night to help pain and allow him to sleep. PLOF: chronic right knee pain- severe OA  Mechanism of Injury: OA  Previous Treatment/Compliance: pain medication, rest, AD  PMHx/Surgical Hx: OA, HTN  Work Hx: retired  Living Situation: lives with disabled wife  Pt Goals: \"Competitive tennis by 2019. \"  Barriers: age, pain  Motivation: high  Substance use: prescription medication  FABQ Score: not captured        OBJECTIVE/EXAMINATION  Posture:  Weight shifted to the left in standing  Other Observations: right knee held in slight flexion and hip is ER in standing; grade III pitting edema in right lower leg; surgical incision healing nicely- small scab at distal incision   Gait and Functional Mobility:  Antalgic, uneven step length; decreased knee extension during stance phase  Palpation: tenderness to palpation medial knee    Right Knee AROM -/18/92 PROM -/14/95    Joint Mobility Assessment: hypomobility in right patella and tibiofemoral joint    Flexibility: tightness in right calf musculature, hamstrings and quads    LOWER QUARTER   MUSCLE STRENGTH  KEY       R  L  0 - No Contraction  Knee ext  5/5  --  1 - Trace            flex  4/5  --  2 - Poor   Hip ext   4/5  --  3 - Fair          flex   5/5  --  4 - Good         abd  3+/5  --  5 - Normal         add  5/5  --      Ankle DF  5/5  --                PF  5/5  --              Neurological: Reflexes / Sensations: normal  Special Tests:NT      Modality rationale: decrease edema, decrease inflammation and decrease pain to improve the patients ability to ambulate and perform functional activities with increased ease   Min Type Additional Details    [] Estim: []Att   []Unatt        []TENS instruct                  []IFC  []Premod   []NMES                     []Other:  []w/US   []w/ice   []w/heat  Position:  Location:    []  Traction: [] Cervical       []Lumbar                       [] Prone          []Supine                       []Intermittent   []Continuous Lbs:  [] before manual  [] after manual  []w/heat    []  Ultrasound: []Continuous   [] Pulsed at:                           []1MHz   []3MHz Location:  W/cm2:    [] Paraffin         Location:   []w/heat    []  Ice     []  Heat  []  Ice massage Position:  Location:    []  Laser  []  Other: Position:  Location:   15   [x]  Vasopneumatic Device Pressure:       [] lo [x] med [] hi   Temperature: 34 degrees  Location: right knee with LE elevated on wedge at end of session    [x] Skin assessment post-treatment:  [x]intact []redness- no adverse reaction    []redness  adverse reaction:     10 min Therapeutic Exercise:  [x] See flow sheet :   Rationale: increase ROM, increase strength and improve coordination to improve the patients ability to ambulate safely and perform functional activities with increased ease          With [] TE   [] TA   [] neuro   [] other: Patient Education: [x] Review HEP- pt given handout with exercises for HEP   [] Progressed/Changed HEP based on:   [] positioning   [] body mechanics   [] transfers   [] heat/ice application    [] other:      Other Objective/Functional Measures: FOTO: not captured    Pain Level (0-10 scale) post treatment: \"sore\"    ASSESSMENT/Changes in Function:   Pt is a 68year old male who is referred to Physical Therapy by Dr. Aleja Claros s/p right total knee replacement, 03/20/2018. Pt demonstrates significant swelling in right LE that has pt states has been present since surgery. Stiffness noted in right knee, flexion and extension- pt ambulates with antalgic gait. Patient will benefit from skilled PT services to modify and progress therapeutic interventions, address functional mobility deficits, address ROM deficits, address strength deficits, analyze and address soft tissue restrictions, analyze and cue movement patterns, analyze and modify body mechanics/ergonomics, assess and modify postural abnormalities and address imbalance to attain pt/PT goals.     [x]  See Plan of [address] PT 4/11/2018  3:36 PM

## 2018-04-16 ENCOUNTER — HOSPITAL ENCOUNTER (OUTPATIENT)
Dept: PHYSICAL THERAPY | Age: 78
Discharge: HOME | End: 2018-04-16
Payer: MEDICARE

## 2018-04-16 PROCEDURE — 97016 VASOPNEUMATIC DEVICE THERAPY: CPT

## 2018-04-16 PROCEDURE — 97110 THERAPEUTIC EXERCISES: CPT

## 2018-04-16 PROCEDURE — 97140 MANUAL THERAPY 1/> REGIONS: CPT

## 2018-04-16 NOTE — PROGRESS NOTES
PT DAILY TREATMENT NOTE - [facility] 2-15    Patient Name: Mike Donahue  Date:2018  : 1940  [x]  Patient  Verified  Payor: [facility] MEDICARE / Plan: [place] / Product Type: Managed Care Medicare /    In time:12:30P  Out time:1:30P  Total Treatment Time (min): 60  Total Timed Codes (min): 45  1:1 Treatment Time ( [address] only): 39   Visit #: 2     Treatment Area: Right knee pain [M25.561]    SUBJECTIVE  Pain Level (0-10 scale): 1/10  Any medication changes, allergies to medications, adverse drug reactions, diagnosis change, or new procedure performed?: [x] No    [] Yes (see summary sheet for update)  Subjective functional status/changes:   [] No changes reported  Pt reported having trouble getting comfortable to sleep at night. Most pain in the knee cap. Pt 30 min lat for appointment.       OBJECTIVE    Modality rationale: decrease edema, decrease inflammation and decrease pain to improve the patients ability to decrease R knee swelling and pain   Min Type Additional Details    [] Estim: []Att   []Unatt        []TENS instruct                  []IFC  []Premod   []NMES                     []Other:  []w/US   []w/ice   []w/heat  Position:  Location:    []  Traction: [] Cervical       []Lumbar                       [] Prone          []Supine                       []Intermittent   []Continuous Lbs:  [] before manual  [] after manual  []w/heat    []  Ultrasound: []Continuous   [] Pulsed at:                           []1MHz   []3MHz Location:  W/cm2:    [] Paraffin         Location:   []w/heat    []  Ice     []  Heat  []  Ice massage Position:  Location:    []  Laser  []  Other: Position:  Location:     15 post [x]  Vasopneumatic Device Pressure:       [] lo [x] med [] hi   Temperature: 34     [x] Skin assessment post-treatment:  [x]intact []redness- no adverse reaction    []redness  adverse reaction:     15 min Therapeutic Exercise:  [x] See flow sheet :   Rationale: increase ROM, increase strength and improve coordination to improve the patients ability to increase function and mobility    30 min Manual Therapy: R patella mobs inf/sup M/L, passive stretching R knee extension, and flexion (seated at [facility])    Rationale: decrease pain, increase ROM and increase tissue extensibility to improve the patients ability to increase R knee mobility    With   [] TE   [] TA   [] neuro   [] other: Patient Education: [x] Review HEP    [] Progressed/Changed HEP based on:   [] positioning   [] body mechanics   [] transfers   [] heat/ice application    [] other:      Other Objective/Functional Measures:--    Pain Level (0-10 scale) post treatment: 0/10    ASSESSMENT/Changes in Function:   Advised pt on sleeping positions and use of a pillow to help with comfort. Pt advised on self patella mobilizations at home. Patient will continue to benefit from skilled PT services to modify and progress therapeutic interventions, address functional mobility deficits, address ROM deficits, address strength deficits, analyze and address soft tissue restrictions, analyze and cue movement patterns, analyze and modify body mechanics/ergonomics and assess and modify postural abnormalities to attain remaining goals. [x]  See Plan of Care  []  See progress note/recertification  []  See Discharge Summary         Progress towards goals / Updated goals:  Short Term Goals: To be accomplished in 3 weeks:  1) Pt will be Independent with HEP . 2) Pt will demonstrate knee extension >/= to -8 degrees ext to aid in ambulation. 3) Pt will demonstrate knee flexion >/= to 110 to aid in sitting.      Long Term Goals: To be accomplished in 6 weeks:  1) Pt will be able to navigate a flight of stairs without pain. 2) Pt will be able to ambulate on uneven terrain without pain or instability. 3) Pt will demonstrate knee extension = 0 degrees to aid in ambulation.   4) Pt will demonstrate knee flexion >/= 115 degrees to aid in sitting/squatting.     PLAN  [x]  Upgrade activities as tolerated     [x]  Continue plan of care  []  Update interventions per flow sheet       []  Discharge due to:_  []  Other:_      Yuliet Singh PTA 4/16/2018  12:35 PM

## 2018-04-19 ENCOUNTER — HOSPITAL ENCOUNTER (OUTPATIENT)
Dept: PHYSICAL THERAPY | Age: 78
Discharge: HOME | End: 2018-04-19
Payer: MEDICARE

## 2018-04-19 PROCEDURE — 97140 MANUAL THERAPY 1/> REGIONS: CPT

## 2018-04-19 PROCEDURE — 97016 VASOPNEUMATIC DEVICE THERAPY: CPT

## 2018-04-19 PROCEDURE — 97110 THERAPEUTIC EXERCISES: CPT

## 2018-04-19 NOTE — PROGRESS NOTES
PT DAILY TREATMENT NOTE - [facility] 2-15    Patient Name: Lexie Common  Date:2018  : 1940  [x]  Patient  Verified  Payor: [employer] MEDICARE / Plan: [address] / Product Type: Managed Care Medicare /    In time: 1:40pm Out time:2:50pm  Total Treatment Time (min): 70  Total Timed Codes (min): 55  1:1 Treatment Time ( [address] only): 30  Visit #: 3     Treatment Area: Right knee pain [M25.561]    SUBJECTIVE  Pain Level (0-10 scale): 2/10  Any medication changes, allergies to medications, adverse drug reactions, diagnosis change, or new procedure performed?: [x] No    [] Yes (see summary sheet for update)  Subjective functional status/changes:   [] No changes reported  Pt states that both knees are bothering him today. Pt's primary compliant is difficulty sleeping at night.      OBJECTIVE  Modality rationale: decrease edema, decrease inflammation and decrease pain to improve the patients ability to decrease R knee swelling and pain   Min Type Additional Details    [] Estim: []Att   []Unatt        []TENS instruct                  []IFC  []Premod   []NMES                     []Other:  []w/US   []w/ice   []w/heat  Position:  Location:    []  Traction: [] Cervical       []Lumbar                       [] Prone          []Supine                       []Intermittent   []Continuous Lbs:  [] before manual  [] after manual  []w/heat    []  Ultrasound: []Continuous   [] Pulsed at:                           []1MHz   []3MHz Location:  W/cm2:    [] Paraffin         Location:   []w/heat   5 []  Ice     [x]  Heat  []  Ice massage Position: long sitting  Location: right posterior knee at beginning of session with quad set    []  Laser  []  Other: Position:  Location:     15 post [x]  Vasopneumatic Device Pressure:       [] lo [x] med [] hi   Temperature: 34 degrees  Location: supine with R LE elevated on wedge   [x] Skin assessment post-treatment:  [x]intact []redness- no adverse reaction    []redness  adverse reaction:     40 min Therapeutic Exercise:  [x] See flow sheet :   Rationale: increase ROM, increase strength and improve coordination to improve the patients ability to increase function and mobility    15 min Manual Therapy: R patella mobs inf/sup M/L, gentle right tibiofemoral joint distraction, grade II and III; PROM right knee flexion and extension to pt's tolerance   Rationale: decrease pain, increase ROM and increase tissue extensibility to improve the patients ability to increase R knee mobility    With   [] TE   [] TA   [] neuro   [] other: Patient Education: [x] Review HEP    [] Progressed/Changed HEP based on:   [] positioning   [] body mechanics   [] transfers   [] heat/ice application    [] other:      Other Objective/Functional Measures:--    Pain Level (0-10 scale) post treatment: 0/10    ASSESSMENT/Changes in Function:   Pt tolerated progression of stretching and quad strengthening exercises without increase in pain. Pt encouraged to use stationary bike (that he has at home), daily and work on right knee extension stretching at least 3x/day. Patient will continue to benefit from skilled PT services to modify and progress therapeutic interventions, address functional mobility deficits, address ROM deficits, address strength deficits, analyze and address soft tissue restrictions, analyze and cue movement patterns, analyze and modify body mechanics/ergonomics and assess and modify postural abnormalities to attain remaining goals. [x]  See Plan of Care  []  See progress note/recertification  []  See Discharge Summary         Progress towards goals / Updated goals:  Short Term Goals: To be accomplished in 3 weeks:  1) Pt will be Independent with HEP . 2) Pt will demonstrate knee extension >/= to -8 degrees ext to aid in ambulation. 3) Pt will demonstrate knee flexion >/= to 110 to aid in sitting.      Long Term Goals:  To be accomplished in 6 weeks:  1) Pt will be able to navigate a flight of stairs without pain. 2) Pt will be able to ambulate on uneven terrain without pain or instability. 3) Pt will demonstrate knee extension = 0 degrees to aid in ambulation. 4) Pt will demonstrate knee flexion >/= 115 degrees to aid in sitting/squatting.     PLAN  [x]  Upgrade activities as tolerated     [x]  Continue plan of care  []  Update interventions per flow sheet       []  Discharge due to:_  []  Other:_      Karol Johnson, PT, DPT   4/19/2018  12:35 PM

## 2018-04-23 ENCOUNTER — HOSPITAL ENCOUNTER (OUTPATIENT)
Dept: PHYSICAL THERAPY | Age: 78
Discharge: HOME | End: 2018-04-23
Payer: MEDICARE

## 2018-04-23 PROCEDURE — 97016 VASOPNEUMATIC DEVICE THERAPY: CPT

## 2018-04-23 PROCEDURE — 97112 NEUROMUSCULAR REEDUCATION: CPT

## 2018-04-23 PROCEDURE — 97110 THERAPEUTIC EXERCISES: CPT

## 2018-04-23 PROCEDURE — 97140 MANUAL THERAPY 1/> REGIONS: CPT

## 2018-04-23 NOTE — PROGRESS NOTES
PT DAILY TREATMENT NOTE - [facility] 2-15    Patient Name: Margaret Lemus  Date:2018  : 1940  [x]  Patient  Verified  Payor: [place] [employer] MEDICARE / Plan: [place] / Product Type: Managed Care Medicare /    In time: 2:00pm Out time:3:10pm  Total Treatment Time (min): 70  Total Timed Codes (min): 55  1:1 Treatment Time ( [address] only): 54  Visit #: 4     Treatment Area: Right knee pain [M25.561]    SUBJECTIVE  Pain Level (0-10 scale): 0/10  Any medication changes, allergies to medications, adverse drug reactions, diagnosis change, or new procedure performed?: [x] No    [] Yes (see summary sheet for update)  Subjective functional status/changes:   [] No changes reported  Pt was not able to use the recumbent bike at his gym since last visit.      OBJECTIVE  Modality rationale: decrease edema, decrease inflammation and decrease pain to improve the patients ability to decrease R knee swelling and pain   Min Type Additional Details    [] Estim: []Att   []Unatt        []TENS instruct                  []IFC  []Premod   []NMES                     []Other:  []w/US   []w/ice   []w/heat  Position:  Location:    []  Traction: [] Cervical       []Lumbar                       [] Prone          []Supine                       []Intermittent   []Continuous Lbs:  [] before manual  [] after manual  []w/heat    []  Ultrasound: []Continuous   [] Pulsed at:                           []1MHz   []3MHz Location:  W/cm2:    [] Paraffin         Location:   []w/heat    []  Ice     []  Heat  []  Ice massage Position:   Location:     []  Laser  []  Other: Position:  Location:     15 post [x]  Vasopneumatic Device Pressure:       [] lo [x] med [] hi   Temperature: 34 degrees  Location: supine with R LE elevated on wedge   [x] Skin assessment post-treatment:  [x]intact []redness- no adverse reaction    []redness  adverse reaction:     35 min Therapeutic Exercise:  [x] See flow sheet :   Rationale: increase ROM, increase strength and improve coordination to improve the patients ability to increase function and mobility    10 min Manual Therapy: R patella mobs inf/sup M/L, gentle right tibiofemoral joint distraction, grade II and III; PROM right knee flexion and extension to pt's tolerance   Rationale: decrease pain, increase ROM and increase tissue extensibility to improve the patients ability to increase R knee mobility     10 min Neuromuscular Re-education:  [x]  See flow sheet :   Rationale: increase strength, improve coordination and improve balance  to improve the patients ability to ambulate safely with normalized gait    With   [] TE   [] TA   [] neuro   [] other: Patient Education: [x] Review HEP    [] Progressed/Changed HEP based on:   [] positioning   [] body mechanics   [] transfers   [] heat/ice application    [] other:      Other Objective/Functional Measures:--    Pain Level (0-10 scale) post treatment: 0/10    ASSESSMENT/Changes in Function:   Pt instructed to work on right knee extension exercises at home; suggested propping foot on coffee table and perform quad set for more aggressive stretch. Pt required hand-held assistance for cone walking drills for safety. Patient will continue to benefit from skilled PT services to modify and progress therapeutic interventions, address functional mobility deficits, address ROM deficits, address strength deficits, analyze and address soft tissue restrictions, analyze and cue movement patterns, analyze and modify body mechanics/ergonomics and assess and modify postural abnormalities to attain remaining goals. [x]  See Plan of Care  []  See progress note/recertification  []  See Discharge Summary         Progress towards goals / Updated goals:  Short Term Goals: To be accomplished in 3 weeks:  1) Pt will be Independent with HEP . 2) Pt will demonstrate knee extension >/= to -8 degrees ext to aid in ambulation.   3) Pt will demonstrate knee flexion >/= to 110 to aid in sitting.      Long Term Goals: To be accomplished in 6 weeks:  1) Pt will be able to navigate a flight of stairs without pain. 2) Pt will be able to ambulate on uneven terrain without pain or instability. 3) Pt will demonstrate knee extension = 0 degrees to aid in ambulation. 4) Pt will demonstrate knee flexion >/= 115 degrees to aid in sitting/squatting.     PLAN  [x]  Upgrade activities as tolerated     [x]  Continue plan of care  []  Update interventions per flow sheet       []  Discharge due to:_  []  Other:_      Roro Anthony PT, DPT   4/23/2018  12:35 PM

## 2018-04-30 ENCOUNTER — HOSPITAL ENCOUNTER (OUTPATIENT)
Dept: PHYSICAL THERAPY | Age: 78
Discharge: HOME | End: 2018-04-30
Payer: MEDICARE

## 2018-04-30 PROCEDURE — G8980 MOBILITY D/C STATUS: HCPCS

## 2018-04-30 PROCEDURE — 97140 MANUAL THERAPY 1/> REGIONS: CPT

## 2018-04-30 PROCEDURE — 97110 THERAPEUTIC EXERCISES: CPT

## 2018-04-30 PROCEDURE — G8979 MOBILITY GOAL STATUS: HCPCS

## 2018-04-30 NOTE — PROGRESS NOTES
PT DAILY TREATMENT NOTE - [facility] 2-15    Patient Name: Thanh Link  Date:2018  : 1940  [x]  Patient  Verified  Payor: [place] [employer] / Plan: [address] / Product Type: [employer] Medicare /    In time: 10:00am Out time: 11:05am  Total Treatment Time (min): 65  Total Timed Codes (min): 50  1:1 Treatment Time ( [address] only): 30  Visit #: 5    Treatment Area: Right knee pain [M25.561]    SUBJECTIVE  Pain Level (0-10 scale): 2/10  Any medication changes, allergies to medications, adverse drug reactions, diagnosis change, or new procedure performed?: [x] No    [] Yes (see summary sheet for update)  Subjective functional status/changes:   [] No changes reported  Pt complains of increased pain and stiffness in right knee today but is not sure why.     OBJECTIVE  Modality rationale: decrease edema, decrease inflammation and decrease pain to improve the patients ability to decrease R knee swelling and pain   Min Type Additional Details    [] Estim: []Att   []Unatt        []TENS instruct                  []IFC  []Premod   []NMES                     []Other:  []w/US   []w/ice   []w/heat  Position:  Location:    []  Traction: [] Cervical       []Lumbar                       [] Prone          []Supine                       []Intermittent   []Continuous Lbs:  [] before manual  [] after manual  []w/heat    []  Ultrasound: []Continuous   [] Pulsed at:                           []1MHz   []3MHz Location:  W/cm2:    [] Paraffin         Location:   []w/heat   10 [x]  Ice     []  Heat  []  Ice massage Position: supine with right LE elevated on wedge  Location:  Right knee at end of session    []  Laser  []  Other: Position:  Location:    []  Vasopneumatic Device Pressure:       [] lo [] med [] hi   Temperature:   Location:    [x] Skin assessment post-treatment:  [x]intact []redness- no adverse reaction    []redness  adverse reaction:     35 min Therapeutic Exercise:  [x] See flow sheet :   Rationale: increase ROM, increase strength and improve coordination to improve the patients ability to increase function and mobility    15 min Manual Therapy: R patella mobs inferior/superior and medial/lateral grade II, gentle right tibiofemoral joint distraction, grade II and III; PROM right knee flexion and extension to pt's tolerance   Rationale: decrease pain, increase ROM and increase tissue extensibility to improve the patients ability to increase R knee mobility    With   [] TE   [] TA   [] neuro   [] other: Patient Education: [x] Review HEP    [] Progressed/Changed HEP based on:   [] positioning   [] body mechanics   [] transfers   [] heat/ice application    [] other:      Other Objective/Functional Measures:--    Pain Level (0-10 scale) post treatment: 0/10    ASSESSMENT/Changes in Function:   Pt reported improvement in pain and demonstrates improved ambulation at end of session. Patient will continue to benefit from skilled PT services to modify and progress therapeutic interventions, address functional mobility deficits, address ROM deficits, address strength deficits, analyze and address soft tissue restrictions, analyze and cue movement patterns, analyze and modify body mechanics/ergonomics and assess and modify postural abnormalities to attain remaining goals. [x]  See Plan of Care  []  See progress note/recertification  []  See Discharge Summary         Progress towards goals / Updated goals:  Short Term Goals: To be accomplished in 3 weeks:  1) Pt will be Independent with HEP . 2) Pt will demonstrate knee extension >/= to -8 degrees ext to aid in ambulation. 3) Pt will demonstrate knee flexion >/= to 110 to aid in sitting.      Long Term Goals: To be accomplished in 6 weeks:  1) Pt will be able to navigate a flight of stairs without pain. 2) Pt will be able to ambulate on uneven terrain without pain or instability.   3) Pt will demonstrate knee extension = 0 degrees to aid in ambulation. 4) Pt will demonstrate knee flexion >/= 115 degrees to aid in sitting/squatting.     PLAN  [x]  Upgrade activities as tolerated     [x]  Continue plan of care  []  Update interventions per flow sheet       []  Discharge due to:_  []  Other:_      Siomara Decker, PT, DPT   4/30/2018  12:35 PM

## 2018-05-18 ENCOUNTER — APPOINTMENT (OUTPATIENT)
Dept: PHYSICAL THERAPY | Age: 78
End: 2018-05-18

## 2018-05-21 ENCOUNTER — HOSPITAL ENCOUNTER (OUTPATIENT)
Dept: PHYSICAL THERAPY | Age: 78
End: 2018-05-21

## 2018-05-21 NOTE — ANCILLARY DISCHARGE INSTRUCTIONS
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 MET  2) Pt will demonstrate knee extension >/= to -8 degrees ext to aid in ambulation. NOT MET  3) Pt will demonstrate knee flexion >/= to 110 to aid in sitting. MET      Long Term Goals: To be accomplished in 6 weeks:  1) Pt will be able to navigate a flight of stairs without pain. NOT MET  2) Pt will be able to ambulate on uneven terrain without pain or instability. NOT MET  3) Pt will demonstrate knee extension = 0 degrees to aid in ambulation. NOT MET  4) Pt will demonstrate knee flexion >/= 115 degrees to aid in sitting/squatting. MET     Assessment/Summary of care: Pt participated in 5 outpatient PT sessions, 2018-2018, s/p right TKR, 2018. Treatment included therapeutic exercise, manual therapy, neuromuscular re-education, Game Ready (cryotherapy with vaso-pneumatic compression), moist hot pack, pt education and home exercise program. Pt started outpatient PT at [facility], which is more convenient to his house; therefore, pt is discharged at this time.     G-Codes (GP)  Mobility   [id number] Goal  CI= 1-19%  D/C  CJ= 20-39%    The severity rating is based on clinical judgment.     RECOMMENDATIONS:  [x]Discontinue therapy: []Patient has reached or is progressing toward set goals     []Patient is non-compliant or has abdicated     []Due to lack of appreciable progress towards set goals     [x]Pt started outpatient PT at [facility], [facility] [place]   5/21/2018 2:03 PM

## 2018-05-23 ENCOUNTER — APPOINTMENT (OUTPATIENT)
Dept: PHYSICAL THERAPY | Age: 78
End: 2018-05-23

## 2019-05-30 ENCOUNTER — HOSPITAL ENCOUNTER (OUTPATIENT)
Dept: PREADMISSION TESTING | Age: 79
Discharge: HOME | End: 2019-05-30
Attending: ORTHOPAEDIC SURGERY
Payer: MEDICARE

## 2019-05-30 DIAGNOSIS — Z01.818 OTHER SPECIFIED PRE-OPERATIVE EXAMINATION: ICD-10-CM

## 2019-05-30 DIAGNOSIS — M17.12 DEGENERATIVE ARTHRITIS OF LEFT KNEE: ICD-10-CM

## 2019-05-30 DIAGNOSIS — Z01.812 BLOOD TESTS PRIOR TO TREATMENT OR PROCEDURE: ICD-10-CM

## 2019-05-30 LAB
ALBUMIN SERPL-MCNC: 4 G/DL (ref 3.4–5)
ALBUMIN/GLOB SERPL: 1.3 {RATIO} (ref 0.8–1.7)
ALP SERPL-CCNC: 80 U/L (ref 45–117)
ALT SERPL-CCNC: 27 U/L (ref 16–61)
ANION GAP SERPL CALC-SCNC: 9 MMOL/L (ref 3–18)
APPEARANCE UR: CLEAR
APTT PPP: 28.6 SEC (ref 23–36.4)
AST SERPL-CCNC: 18 U/L (ref 15–37)
ATRIAL RATE: 53 BPM
BACTERIA SPEC CULT: NORMAL
BASOPHILS # BLD: 0 K/UL (ref 0–0.1)
BASOPHILS NFR BLD: 0 % (ref 0–2)
BILIRUB SERPL-MCNC: 0.9 MG/DL (ref 0.2–1)
BILIRUB UR QL: NEGATIVE
BUN SERPL-MCNC: 12 MG/DL (ref 7–18)
BUN/CREAT SERPL: 16 (ref 12–20)
CALCIUM SERPL-MCNC: 9.2 MG/DL (ref 8.5–10.1)
CALCULATED P AXIS, ECG09: 65 DEGREES
CALCULATED R AXIS, ECG10: 79 DEGREES
CALCULATED T AXIS, ECG11: 34 DEGREES
CHLORIDE SERPL-SCNC: 99 MMOL/L (ref 100–108)
CO2 SERPL-SCNC: 30 MMOL/L (ref 21–32)
COLOR UR: YELLOW
CREAT SERPL-MCNC: 0.76 MG/DL (ref 0.6–1.3)
DIAGNOSIS, 93000: NORMAL
DIFFERENTIAL METHOD BLD: ABNORMAL
EOSINOPHIL # BLD: 0.2 K/UL (ref 0–0.4)
EOSINOPHIL NFR BLD: 2 % (ref 0–5)
ERYTHROCYTE [DISTWIDTH] IN BLOOD BY AUTOMATED COUNT: 12.7 % (ref 11.6–14.5)
ERYTHROCYTE [SEDIMENTATION RATE] IN BLOOD: 3 MM/HR (ref 0–20)
GLOBULIN SER CALC-MCNC: 3 G/DL (ref 2–4)
GLUCOSE SERPL-MCNC: 94 MG/DL (ref 74–99)
GLUCOSE UR STRIP.AUTO-MCNC: NEGATIVE MG/DL
HBA1C MFR BLD: 5.1 % (ref 4.2–5.6)
HCT VFR BLD AUTO: 44.9 % (ref 36–48)
HGB BLD-MCNC: 15.8 G/DL (ref 13–16)
HGB UR QL STRIP: NEGATIVE
INR PPP: 1 (ref 0.8–1.2)
KETONES UR QL STRIP.AUTO: NEGATIVE MG/DL
LEUKOCYTE ESTERASE UR QL STRIP.AUTO: NEGATIVE
LYMPHOCYTES # BLD: 1.1 K/UL (ref 0.9–3.6)
LYMPHOCYTES NFR BLD: 15 % (ref 21–52)
MCH RBC QN AUTO: 32.7 PG (ref 24–34)
MCHC RBC AUTO-ENTMCNC: 35.2 G/DL (ref 31–37)
MCV RBC AUTO: 93 FL (ref 74–97)
MONOCYTES # BLD: 1 K/UL (ref 0.05–1.2)
MONOCYTES NFR BLD: 14 % (ref 3–10)
NEUTS SEG # BLD: 4.8 K/UL (ref 1.8–8)
NEUTS SEG NFR BLD: 69 % (ref 40–73)
NITRITE UR QL STRIP.AUTO: NEGATIVE
P-R INTERVAL, ECG05: 184 MS
PH UR STRIP: 6.5 [PH] (ref 5–8)
PLATELET # BLD AUTO: 178 K/UL (ref 135–420)
PMV BLD AUTO: 10.9 FL (ref 9.2–11.8)
POTASSIUM SERPL-SCNC: 3.4 MMOL/L (ref 3.5–5.5)
PROT SERPL-MCNC: 7 G/DL (ref 6.4–8.2)
PROT UR STRIP-MCNC: NEGATIVE MG/DL
PROTHROMBIN TIME: 12.9 SEC (ref 11.5–15.2)
Q-T INTERVAL, ECG07: 448 MS
QRS DURATION, ECG06: 108 MS
QTC CALCULATION (BEZET), ECG08: 420 MS
RBC # BLD AUTO: 4.83 M/UL (ref 4.7–5.5)
SERVICE CMNT-IMP: NORMAL
SODIUM SERPL-SCNC: 138 MMOL/L (ref 136–145)
SP GR UR REFRACTOMETRY: 1.01 (ref 1–1.03)
UROBILINOGEN UR QL STRIP.AUTO: 1 EU/DL (ref 0.2–1)
VENTRICULAR RATE, ECG03: 53 BPM
WBC # BLD AUTO: 7 K/UL (ref 4.6–13.2)

## 2019-05-30 PROCEDURE — 83036 HEMOGLOBIN GLYCOSYLATED A1C: CPT

## 2019-05-30 PROCEDURE — 81003 URINALYSIS AUTO W/O SCOPE: CPT

## 2019-05-30 PROCEDURE — 85025 COMPLETE CBC W/AUTO DIFF WBC: CPT

## 2019-05-30 PROCEDURE — 85610 PROTHROMBIN TIME: CPT

## 2019-05-30 PROCEDURE — 36415 COLL VENOUS BLD VENIPUNCTURE: CPT

## 2019-05-30 PROCEDURE — 80053 COMPREHEN METABOLIC PANEL: CPT

## 2019-05-30 PROCEDURE — 87641 MR-STAPH DNA AMP PROBE: CPT

## 2019-05-30 PROCEDURE — 85730 THROMBOPLASTIN TIME PARTIAL: CPT

## 2019-05-30 PROCEDURE — 93005 ELECTROCARDIOGRAM TRACING: CPT

## 2019-05-30 PROCEDURE — 85652 RBC SED RATE AUTOMATED: CPT

## 2019-06-25 PROBLEM — M17.12 OSTEOARTHRITIS OF LEFT KNEE: Chronic | Status: ACTIVE | Noted: 2019-06-25

## 2019-06-25 NOTE — H&P
[address] [facility] [address]  History and Physical Exam    Patient: Ben Ayon MRN: [id number]  SSN: [id number]    YOB: 1940  Age: 66 y.o. Sex: male      Subjective:      Chief Complaint: Left knee pain    History of Present Illness:  Patient complains of pain to the left knee and difficulty ambulating, which has progressively worsened over several months. X-rays showed osteoarthritis of the joint. The patient's pain has persisted and progressed despite conservative treatments and therapies. The patient has been previously treated with meds, injections. The patient has at this time opted for surgical intervention. Past Medical History:   Diagnosis Date    Arthritis     knee, hand    Asthma     up to age 15    Hypertension 2004    Osteoarthritis of left knee 6/25/2019    Osteoarthritis of right knee 3/19/2018     Past Surgical History:   Procedure Laterality Date    HX CATARACT REMOVAL      HX HEENT      wisdom teeth removed    HX ORTHOPAEDIC Right 2018    TKR     Social History     Occupational History    Not on file   Tobacco Use    Smoking status: Never Smoker    Smokeless tobacco: Never Used   Substance and Sexual Activity    Alcohol use: No    Drug use: No    Sexual activity: Not on file     Prior to Admission medications    Medication Sig Start Date End Date Taking? Authorizing Provider   simvastatin (ZOCOR) 10 mg tablet Take 20 mg by mouth nightly. Provider, Historical   losartan-hydroCHLOROthiazide (HYZAAR) 100-25 mg per tablet Take 1 Tab by mouth daily. Provider, Historical       Allergies: Allergies   Allergen Reactions    Shrimp Other (comments)     Irritation on hands        Review of Systems:  Pertinent items are noted in the History of Present Illness.     Objective:       Physical Exam:  HEENT: Normocephalic, atraumatic  Lungs:  Clear to auscultation  Heart:   Regular rate and rhythm  Abdomen: Soft  Extremities:  Pain with range of motion of the left knee. Active ROM limited due to pain. Tenderness generalized. Crepitus present. Antalgic gait. Assessment:      Arthritis of the left knee. Plan:       Proceed with scheduled LEFT TOTAL KNEE ARTHROPLASTY. The various methods of treatment have been discussed with the patient and family. After consideration of risks, benefits, and other options for treatment, the patient has consented to surgical interventions. Questions were answered and preoperative teaching was done by Dr Dana Montiel.      Signed By: SANA Payne     June 25, 2019

## 2019-06-27 ENCOUNTER — ANESTHESIA (OUTPATIENT)
Dept: SURGERY | Age: 79
DRG: 470 | End: 2019-06-27
Payer: MEDICARE

## 2019-06-27 ENCOUNTER — APPOINTMENT (OUTPATIENT)
Dept: GENERAL RADIOLOGY | Age: 79
DRG: 470 | End: 2019-06-27
Attending: PHYSICIAN ASSISTANT
Payer: MEDICARE

## 2019-06-27 ENCOUNTER — HOME HEALTH ADMISSION (OUTPATIENT)
Dept: HOME HEALTH SERVICES | Facility: HOME HEALTH | Age: 79
End: 2019-06-27
Payer: MEDICARE

## 2019-06-27 ENCOUNTER — HOSPITAL ENCOUNTER (INPATIENT)
Age: 79
LOS: 1 days | Discharge: HOME HEALTH | DRG: 470 | End: 2019-06-27
Attending: ORTHOPAEDIC SURGERY | Admitting: ORTHOPAEDIC SURGERY
Payer: MEDICARE

## 2019-06-27 ENCOUNTER — ANESTHESIA EVENT (OUTPATIENT)
Dept: SURGERY | Age: 79
DRG: 470 | End: 2019-06-27
Payer: MEDICARE

## 2019-06-27 VITALS
BODY MASS INDEX: 28.61 KG/M2 | WEIGHT: 193.2 LBS | HEIGHT: 69 IN | TEMPERATURE: 97.6 F | DIASTOLIC BLOOD PRESSURE: 69 MMHG | SYSTOLIC BLOOD PRESSURE: 137 MMHG | RESPIRATION RATE: 18 BRPM | HEART RATE: 65 BPM | OXYGEN SATURATION: 97 %

## 2019-06-27 DIAGNOSIS — M17.12 PRIMARY OSTEOARTHRITIS OF LEFT KNEE: Primary | Chronic | ICD-10-CM

## 2019-06-27 LAB
ABO + RH BLD: NORMAL
BLOOD GROUP ANTIBODIES SERPL: NORMAL
SPECIMEN EXP DATE BLD: NORMAL

## 2019-06-27 PROCEDURE — 74011250636 HC RX REV CODE- 250/636

## 2019-06-27 PROCEDURE — 65270000029 HC RM PRIVATE

## 2019-06-27 PROCEDURE — 97116 GAIT TRAINING THERAPY: CPT

## 2019-06-27 PROCEDURE — 77030020782 HC GWN BAIR PAWS FLX 3M -B: Performed by: ORTHOPAEDIC SURGERY

## 2019-06-27 PROCEDURE — 77030040361 HC SLV COMPR DVT MDII -B: Performed by: ORTHOPAEDIC SURGERY

## 2019-06-27 PROCEDURE — 74011250636 HC RX REV CODE- 250/636: Performed by: ANESTHESIOLOGY

## 2019-06-27 PROCEDURE — 77030037713 HC CLOSR DEV INCIS ZIP STRY -B: Performed by: ORTHOPAEDIC SURGERY

## 2019-06-27 PROCEDURE — 74011000250 HC RX REV CODE- 250: Performed by: ORTHOPAEDIC SURGERY

## 2019-06-27 PROCEDURE — 77010033678 HC OXYGEN DAILY

## 2019-06-27 PROCEDURE — 77030033263 HC DRSG MEPILEX 16-48IN BORD MOLN -B: Performed by: ORTHOPAEDIC SURGERY

## 2019-06-27 PROCEDURE — 77030011628: Performed by: ORTHOPAEDIC SURGERY

## 2019-06-27 PROCEDURE — 77030016060 HC NDL NRV BLK TELE -A: Performed by: ANESTHESIOLOGY

## 2019-06-27 PROCEDURE — 77030012893: Performed by: ORTHOPAEDIC SURGERY

## 2019-06-27 PROCEDURE — 77030002934 HC SUT MCRYL J&J -B: Performed by: ORTHOPAEDIC SURGERY

## 2019-06-27 PROCEDURE — 76210000006 HC OR PH I REC 0.5 TO 1 HR: Performed by: ORTHOPAEDIC SURGERY

## 2019-06-27 PROCEDURE — C1713 ANCHOR/SCREW BN/BN,TIS/BN: HCPCS | Performed by: ORTHOPAEDIC SURGERY

## 2019-06-27 PROCEDURE — 74011250636 HC RX REV CODE- 250/636: Performed by: ORTHOPAEDIC SURGERY

## 2019-06-27 PROCEDURE — 76010000153 HC OR TIME 1.5 TO 2 HR: Performed by: ORTHOPAEDIC SURGERY

## 2019-06-27 PROCEDURE — 77030034694 HC SCPL CANADY PLSM DISP USMD -E: Performed by: ORTHOPAEDIC SURGERY

## 2019-06-27 PROCEDURE — 73560 X-RAY EXAM OF KNEE 1 OR 2: CPT

## 2019-06-27 PROCEDURE — 77030003666 HC NDL SPINAL BD -A: Performed by: ORTHOPAEDIC SURGERY

## 2019-06-27 PROCEDURE — 77030027138 HC INCENT SPIROMETER -A: Performed by: ORTHOPAEDIC SURGERY

## 2019-06-27 PROCEDURE — 76942 ECHO GUIDE FOR BIOPSY: CPT | Performed by: ORTHOPAEDIC SURGERY

## 2019-06-27 PROCEDURE — C9290 INJ, BUPIVACAINE LIPOSOME: HCPCS | Performed by: ORTHOPAEDIC SURGERY

## 2019-06-27 PROCEDURE — 74011250636 HC RX REV CODE- 250/636: Performed by: PHYSICIAN ASSISTANT

## 2019-06-27 PROCEDURE — 86900 BLOOD TYPING SEROLOGIC ABO: CPT

## 2019-06-27 PROCEDURE — 77030039760: Performed by: ORTHOPAEDIC SURGERY

## 2019-06-27 PROCEDURE — 0SRD0J9 REPLACEMENT OF LEFT KNEE JOINT WITH SYNTHETIC SUBSTITUTE, CEMENTED, OPEN APPROACH: ICD-10-PCS | Performed by: ORTHOPAEDIC SURGERY

## 2019-06-27 PROCEDURE — 36415 COLL VENOUS BLD VENIPUNCTURE: CPT

## 2019-06-27 PROCEDURE — 77030018836 HC SOL IRR NACL ICUM -A: Performed by: ORTHOPAEDIC SURGERY

## 2019-06-27 PROCEDURE — 77030031139 HC SUT VCRL2 J&J -A: Performed by: ORTHOPAEDIC SURGERY

## 2019-06-27 PROCEDURE — 74011000250 HC RX REV CODE- 250

## 2019-06-27 PROCEDURE — 74011250637 HC RX REV CODE- 250/637: Performed by: ANESTHESIOLOGY

## 2019-06-27 PROCEDURE — 77030013708 HC HNDPC SUC IRR PULS STRY –B: Performed by: ORTHOPAEDIC SURGERY

## 2019-06-27 PROCEDURE — 74011000258 HC RX REV CODE- 258: Performed by: ORTHOPAEDIC SURGERY

## 2019-06-27 PROCEDURE — 97161 PT EVAL LOW COMPLEX 20 MIN: CPT

## 2019-06-27 PROCEDURE — 76060000034 HC ANESTHESIA 1.5 TO 2 HR: Performed by: ORTHOPAEDIC SURGERY

## 2019-06-27 PROCEDURE — 77030020259 HC SOL INJ SOD CL 0.9% 100ML BG: Performed by: ORTHOPAEDIC SURGERY

## 2019-06-27 PROCEDURE — 74011250637 HC RX REV CODE- 250/637: Performed by: PHYSICIAN ASSISTANT

## 2019-06-27 PROCEDURE — 77030038010: Performed by: ORTHOPAEDIC SURGERY

## 2019-06-27 PROCEDURE — C1776 JOINT DEVICE (IMPLANTABLE): HCPCS | Performed by: ORTHOPAEDIC SURGERY

## 2019-06-27 DEVICE — SIZE 5 TIBIAL TRAY NONPOROUS
Type: IMPLANTABLE DEVICE | Site: KNEE | Status: FUNCTIONAL
Brand: BALANCED KNEE SYSTEM

## 2019-06-27 DEVICE — SIZE 5 7MM TIBIAL INSERT CR
Type: IMPLANTABLE DEVICE | Site: KNEE | Status: FUNCTIONAL
Brand: BKS E-VITALIZE

## 2019-06-27 DEVICE — CEMENT BNE 40GM FULL DOSE PMMA W/O ANTIBIO HI VISC N RADPQ: Type: IMPLANTABLE DEVICE | Site: KNEE | Status: FUNCTIONAL

## 2019-06-27 DEVICE — 35MM PATELLA, VITAMIN E
Type: IMPLANTABLE DEVICE | Site: KNEE | Status: FUNCTIONAL
Brand: BKS E-VITALIZE

## 2019-06-27 DEVICE — COMPONENT TOT KNEE CEM: Type: IMPLANTABLE DEVICE | Site: KNEE | Status: FUNCTIONAL

## 2019-06-27 DEVICE — LT SIZE 6 FEMORAL CR NONPOROUS
Type: IMPLANTABLE DEVICE | Site: KNEE | Status: FUNCTIONAL
Brand: BKS TRIMAX

## 2019-06-27 RX ORDER — SODIUM CHLORIDE 0.9 % (FLUSH) 0.9 %
5-40 SYRINGE (ML) INJECTION EVERY 8 HOURS
Status: DISCONTINUED | OUTPATIENT
Start: 2019-06-27 | End: 2019-06-27 | Stop reason: HOSPADM

## 2019-06-27 RX ORDER — SODIUM CHLORIDE 9 MG/ML
300 INJECTION, SOLUTION INTRAVENOUS CONTINUOUS
Status: DISPENSED | OUTPATIENT
Start: 2019-06-27 | End: 2019-06-27

## 2019-06-27 RX ORDER — ACETAMINOPHEN 325 MG/1
650 TABLET ORAL EVERY 6 HOURS
Status: DISCONTINUED | OUTPATIENT
Start: 2019-06-27 | End: 2019-06-27 | Stop reason: HOSPADM

## 2019-06-27 RX ORDER — ASPIRIN 81 MG/1
81 TABLET ORAL 2 TIMES DAILY
Qty: 42 TAB | Refills: 0 | Status: SHIPPED | OUTPATIENT
Start: 2019-06-27 | End: 2019-07-18

## 2019-06-27 RX ORDER — NALOXONE HYDROCHLORIDE 0.4 MG/ML
0.1 INJECTION, SOLUTION INTRAMUSCULAR; INTRAVENOUS; SUBCUTANEOUS AS NEEDED
Status: DISCONTINUED | OUTPATIENT
Start: 2019-06-27 | End: 2019-06-27 | Stop reason: HOSPADM

## 2019-06-27 RX ORDER — LABETALOL HCL 20 MG/4 ML
SYRINGE (ML) INTRAVENOUS AS NEEDED
Status: DISCONTINUED | OUTPATIENT
Start: 2019-06-27 | End: 2019-06-27 | Stop reason: HOSPADM

## 2019-06-27 RX ORDER — ZOLPIDEM TARTRATE 5 MG/1
5-10 TABLET ORAL
Status: DISCONTINUED | OUTPATIENT
Start: 2019-06-27 | End: 2019-06-27 | Stop reason: HOSPADM

## 2019-06-27 RX ORDER — LANOLIN ALCOHOL/MO/W.PET/CERES
1 CREAM (GRAM) TOPICAL 3 TIMES DAILY
Status: DISCONTINUED | OUTPATIENT
Start: 2019-06-27 | End: 2019-06-27 | Stop reason: HOSPADM

## 2019-06-27 RX ORDER — GLYCOPYRROLATE 0.2 MG/ML
INJECTION INTRAMUSCULAR; INTRAVENOUS AS NEEDED
Status: DISCONTINUED | OUTPATIENT
Start: 2019-06-27 | End: 2019-06-27 | Stop reason: HOSPADM

## 2019-06-27 RX ORDER — ROPIVACAINE HYDROCHLORIDE 5 MG/ML
INJECTION, SOLUTION EPIDURAL; INFILTRATION; PERINEURAL AS NEEDED
Status: DISCONTINUED | OUTPATIENT
Start: 2019-06-27 | End: 2019-06-27 | Stop reason: HOSPADM

## 2019-06-27 RX ORDER — MELOXICAM 7.5 MG/1
7.5 TABLET ORAL 2 TIMES DAILY
Qty: 28 TAB | Refills: 0 | Status: SHIPPED | OUTPATIENT
Start: 2019-06-27 | End: 2019-07-11

## 2019-06-27 RX ORDER — DIPHENHYDRAMINE HYDROCHLORIDE 50 MG/ML
12.5 INJECTION, SOLUTION INTRAMUSCULAR; INTRAVENOUS
Status: DISCONTINUED | OUTPATIENT
Start: 2019-06-27 | End: 2019-06-27 | Stop reason: HOSPADM

## 2019-06-27 RX ORDER — PREGABALIN 100 MG/1
100 CAPSULE ORAL
Status: COMPLETED | OUTPATIENT
Start: 2019-06-27 | End: 2019-06-27

## 2019-06-27 RX ORDER — SIMVASTATIN 20 MG/1
20 TABLET, FILM COATED ORAL
Status: DISCONTINUED | OUTPATIENT
Start: 2019-06-27 | End: 2019-06-27 | Stop reason: HOSPADM

## 2019-06-27 RX ORDER — DOCUSATE SODIUM 100 MG/1
100 CAPSULE, LIQUID FILLED ORAL DAILY
Status: DISCONTINUED | OUTPATIENT
Start: 2019-06-28 | End: 2019-06-27 | Stop reason: HOSPADM

## 2019-06-27 RX ORDER — ONDANSETRON 2 MG/ML
4 INJECTION INTRAMUSCULAR; INTRAVENOUS
Status: DISCONTINUED | OUTPATIENT
Start: 2019-06-27 | End: 2019-06-27 | Stop reason: HOSPADM

## 2019-06-27 RX ORDER — EPHEDRINE SULFATE/0.9% NACL/PF 25 MG/5 ML
SYRINGE (ML) INTRAVENOUS AS NEEDED
Status: DISCONTINUED | OUTPATIENT
Start: 2019-06-27 | End: 2019-06-27 | Stop reason: HOSPADM

## 2019-06-27 RX ORDER — DEXAMETHASONE SODIUM PHOSPHATE 4 MG/ML
8 INJECTION, SOLUTION INTRA-ARTICULAR; INTRALESIONAL; INTRAMUSCULAR; INTRAVENOUS; SOFT TISSUE ONCE
Status: COMPLETED | OUTPATIENT
Start: 2019-06-27 | End: 2019-06-27

## 2019-06-27 RX ORDER — SODIUM CHLORIDE 0.9 % (FLUSH) 0.9 %
5-40 SYRINGE (ML) INJECTION AS NEEDED
Status: DISCONTINUED | OUTPATIENT
Start: 2019-06-27 | End: 2019-06-27 | Stop reason: HOSPADM

## 2019-06-27 RX ORDER — KETAMINE HYDROCHLORIDE 10 MG/ML
INJECTION, SOLUTION INTRAMUSCULAR; INTRAVENOUS AS NEEDED
Status: DISCONTINUED | OUTPATIENT
Start: 2019-06-27 | End: 2019-06-27 | Stop reason: HOSPADM

## 2019-06-27 RX ORDER — LIDOCAINE HYDROCHLORIDE 20 MG/ML
INJECTION, SOLUTION EPIDURAL; INFILTRATION; INTRACAUDAL; PERINEURAL AS NEEDED
Status: DISCONTINUED | OUTPATIENT
Start: 2019-06-27 | End: 2019-06-27 | Stop reason: HOSPADM

## 2019-06-27 RX ORDER — KETOROLAC TROMETHAMINE 30 MG/ML
15 INJECTION, SOLUTION INTRAMUSCULAR; INTRAVENOUS EVERY 6 HOURS
Status: DISCONTINUED | OUTPATIENT
Start: 2019-06-27 | End: 2019-06-27 | Stop reason: HOSPADM

## 2019-06-27 RX ORDER — MAGNESIUM SULFATE 100 %
4 CRYSTALS MISCELLANEOUS AS NEEDED
Status: DISCONTINUED | OUTPATIENT
Start: 2019-06-27 | End: 2019-06-27 | Stop reason: HOSPADM

## 2019-06-27 RX ORDER — NALOXONE HYDROCHLORIDE 0.4 MG/ML
0.4 INJECTION, SOLUTION INTRAMUSCULAR; INTRAVENOUS; SUBCUTANEOUS AS NEEDED
Status: DISCONTINUED | OUTPATIENT
Start: 2019-06-27 | End: 2019-06-27 | Stop reason: HOSPADM

## 2019-06-27 RX ORDER — INSULIN LISPRO 100 [IU]/ML
INJECTION, SOLUTION INTRAVENOUS; SUBCUTANEOUS ONCE
Status: DISCONTINUED | OUTPATIENT
Start: 2019-06-27 | End: 2019-06-27 | Stop reason: HOSPADM

## 2019-06-27 RX ORDER — CEFAZOLIN SODIUM/WATER 2 G/20 ML
2 SYRINGE (ML) INTRAVENOUS EVERY 8 HOURS
Status: DISCONTINUED | OUTPATIENT
Start: 2019-06-27 | End: 2019-06-27 | Stop reason: HOSPADM

## 2019-06-27 RX ORDER — ASPIRIN 81 MG/1
81 TABLET ORAL 2 TIMES DAILY
Status: DISCONTINUED | OUTPATIENT
Start: 2019-06-27 | End: 2019-06-27 | Stop reason: HOSPADM

## 2019-06-27 RX ORDER — TRANEXAMIC ACID 650 1/1
1950 TABLET ORAL ONCE
Status: COMPLETED | OUTPATIENT
Start: 2019-06-27 | End: 2019-06-27

## 2019-06-27 RX ORDER — SODIUM CHLORIDE, SODIUM LACTATE, POTASSIUM CHLORIDE, CALCIUM CHLORIDE 600; 310; 30; 20 MG/100ML; MG/100ML; MG/100ML; MG/100ML
100 INJECTION, SOLUTION INTRAVENOUS CONTINUOUS
Status: DISCONTINUED | OUTPATIENT
Start: 2019-06-27 | End: 2019-06-27 | Stop reason: HOSPADM

## 2019-06-27 RX ORDER — HYDRALAZINE HYDROCHLORIDE 20 MG/ML
INJECTION INTRAMUSCULAR; INTRAVENOUS AS NEEDED
Status: DISCONTINUED | OUTPATIENT
Start: 2019-06-27 | End: 2019-06-27 | Stop reason: HOSPADM

## 2019-06-27 RX ORDER — ONDANSETRON 2 MG/ML
INJECTION INTRAMUSCULAR; INTRAVENOUS AS NEEDED
Status: DISCONTINUED | OUTPATIENT
Start: 2019-06-27 | End: 2019-06-27 | Stop reason: HOSPADM

## 2019-06-27 RX ORDER — PROPOFOL 10 MG/ML
INJECTION, EMULSION INTRAVENOUS AS NEEDED
Status: DISCONTINUED | OUTPATIENT
Start: 2019-06-27 | End: 2019-06-27 | Stop reason: HOSPADM

## 2019-06-27 RX ORDER — ACETAMINOPHEN 500 MG
1000 TABLET ORAL ONCE
Status: COMPLETED | OUTPATIENT
Start: 2019-06-27 | End: 2019-06-27

## 2019-06-27 RX ORDER — ONDANSETRON 2 MG/ML
4 INJECTION INTRAMUSCULAR; INTRAVENOUS ONCE
Status: DISCONTINUED | OUTPATIENT
Start: 2019-06-27 | End: 2019-06-27 | Stop reason: HOSPADM

## 2019-06-27 RX ORDER — CEFAZOLIN SODIUM/WATER 2 G/20 ML
2 SYRINGE (ML) INTRAVENOUS ONCE
Status: COMPLETED | OUTPATIENT
Start: 2019-06-27 | End: 2019-06-27

## 2019-06-27 RX ORDER — SODIUM CHLORIDE 9 MG/ML
125 INJECTION, SOLUTION INTRAVENOUS CONTINUOUS
Status: DISCONTINUED | OUTPATIENT
Start: 2019-06-27 | End: 2019-06-27 | Stop reason: HOSPADM

## 2019-06-27 RX ORDER — HYDROMORPHONE HYDROCHLORIDE 2 MG/ML
INJECTION, SOLUTION INTRAMUSCULAR; INTRAVENOUS; SUBCUTANEOUS AS NEEDED
Status: DISCONTINUED | OUTPATIENT
Start: 2019-06-27 | End: 2019-06-27 | Stop reason: HOSPADM

## 2019-06-27 RX ORDER — SODIUM CHLORIDE, SODIUM LACTATE, POTASSIUM CHLORIDE, CALCIUM CHLORIDE 600; 310; 30; 20 MG/100ML; MG/100ML; MG/100ML; MG/100ML
125 INJECTION, SOLUTION INTRAVENOUS CONTINUOUS
Status: DISCONTINUED | OUTPATIENT
Start: 2019-06-27 | End: 2019-06-27 | Stop reason: HOSPADM

## 2019-06-27 RX ORDER — LOSARTAN POTASSIUM 50 MG/1
100 TABLET ORAL DAILY
Status: DISCONTINUED | OUTPATIENT
Start: 2019-06-28 | End: 2019-06-27 | Stop reason: HOSPADM

## 2019-06-27 RX ORDER — OXYCODONE AND ACETAMINOPHEN 5; 325 MG/1; MG/1
1 TABLET ORAL
Qty: 60 TAB | Refills: 0 | Status: SHIPPED | OUTPATIENT
Start: 2019-06-27 | End: 2019-07-02

## 2019-06-27 RX ORDER — CELECOXIB 100 MG/1
400 CAPSULE ORAL
Status: COMPLETED | OUTPATIENT
Start: 2019-06-27 | End: 2019-06-27

## 2019-06-27 RX ORDER — IBUPROFEN 200 MG
200 TABLET ORAL
COMMUNITY
End: 2019-06-27

## 2019-06-27 RX ORDER — MIDAZOLAM HYDROCHLORIDE 1 MG/ML
INJECTION, SOLUTION INTRAMUSCULAR; INTRAVENOUS AS NEEDED
Status: DISCONTINUED | OUTPATIENT
Start: 2019-06-27 | End: 2019-06-27 | Stop reason: HOSPADM

## 2019-06-27 RX ORDER — PANTOPRAZOLE SODIUM 40 MG/1
40 TABLET, DELAYED RELEASE ORAL DAILY
Status: DISCONTINUED | OUTPATIENT
Start: 2019-06-27 | End: 2019-06-27 | Stop reason: HOSPADM

## 2019-06-27 RX ORDER — OXYCODONE HYDROCHLORIDE 5 MG/1
5-10 TABLET ORAL
Status: DISCONTINUED | OUTPATIENT
Start: 2019-06-27 | End: 2019-06-27 | Stop reason: HOSPADM

## 2019-06-27 RX ORDER — HYDROCHLOROTHIAZIDE 25 MG/1
25 TABLET ORAL DAILY
Status: DISCONTINUED | OUTPATIENT
Start: 2019-06-28 | End: 2019-06-27 | Stop reason: HOSPADM

## 2019-06-27 RX ORDER — HYDROMORPHONE HYDROCHLORIDE 2 MG/ML
0.5 INJECTION, SOLUTION INTRAMUSCULAR; INTRAVENOUS; SUBCUTANEOUS
Status: DISCONTINUED | OUTPATIENT
Start: 2019-06-27 | End: 2019-06-27 | Stop reason: HOSPADM

## 2019-06-27 RX ORDER — METOCLOPRAMIDE HYDROCHLORIDE 5 MG/ML
10 INJECTION INTRAMUSCULAR; INTRAVENOUS
Status: DISCONTINUED | OUTPATIENT
Start: 2019-06-27 | End: 2019-06-27 | Stop reason: HOSPADM

## 2019-06-27 RX ADMIN — KETOROLAC TROMETHAMINE 15 MG: 30 INJECTION, SOLUTION INTRAMUSCULAR at 18:09

## 2019-06-27 RX ADMIN — SODIUM CHLORIDE, SODIUM LACTATE, POTASSIUM CHLORIDE, AND CALCIUM CHLORIDE 1000 ML: 600; 310; 30; 20 INJECTION, SOLUTION INTRAVENOUS at 08:11

## 2019-06-27 RX ADMIN — Medication 5 MG: at 10:22

## 2019-06-27 RX ADMIN — KETAMINE HYDROCHLORIDE 10 MG: 10 INJECTION, SOLUTION INTRAMUSCULAR; INTRAVENOUS at 10:19

## 2019-06-27 RX ADMIN — PREGABALIN 100 MG: 100 CAPSULE ORAL at 08:17

## 2019-06-27 RX ADMIN — ONDANSETRON 4 MG: 2 INJECTION INTRAMUSCULAR; INTRAVENOUS at 10:48

## 2019-06-27 RX ADMIN — GLYCOPYRROLATE 0.2 MG: 0.2 INJECTION INTRAMUSCULAR; INTRAVENOUS at 10:09

## 2019-06-27 RX ADMIN — Medication 5 MG: at 10:25

## 2019-06-27 RX ADMIN — FERROUS SULFATE TAB 325 MG (65 MG ELEMENTAL FE) 325 MG: 325 (65 FE) TAB at 15:15

## 2019-06-27 RX ADMIN — PROPOFOL 200 MG: 10 INJECTION, EMULSION INTRAVENOUS at 09:46

## 2019-06-27 RX ADMIN — SODIUM CHLORIDE 300 ML/HR: 900 INJECTION, SOLUTION INTRAVENOUS at 13:00

## 2019-06-27 RX ADMIN — DEXAMETHASONE SODIUM PHOSPHATE 8 MG: 4 INJECTION, SOLUTION INTRAMUSCULAR; INTRAVENOUS at 08:17

## 2019-06-27 RX ADMIN — Medication 10 MG: at 10:47

## 2019-06-27 RX ADMIN — ROPIVACAINE HYDROCHLORIDE 30 ML: 5 INJECTION, SOLUTION EPIDURAL; INFILTRATION; PERINEURAL at 09:11

## 2019-06-27 RX ADMIN — HYDROMORPHONE HYDROCHLORIDE 0.2 MG: 2 INJECTION, SOLUTION INTRAMUSCULAR; INTRAVENOUS; SUBCUTANEOUS at 10:12

## 2019-06-27 RX ADMIN — Medication 2 G: at 10:08

## 2019-06-27 RX ADMIN — Medication 5 MG: at 10:26

## 2019-06-27 RX ADMIN — GLYCOPYRROLATE 0.2 MG: 0.2 INJECTION INTRAMUSCULAR; INTRAVENOUS at 09:51

## 2019-06-27 RX ADMIN — CELECOXIB 400 MG: 100 CAPSULE ORAL at 08:17

## 2019-06-27 RX ADMIN — ACETAMINOPHEN 650 MG: 325 TABLET ORAL at 15:15

## 2019-06-27 RX ADMIN — KETAMINE HYDROCHLORIDE 10 MG: 10 INJECTION, SOLUTION INTRAMUSCULAR; INTRAVENOUS at 10:21

## 2019-06-27 RX ADMIN — HYDROMORPHONE HYDROCHLORIDE 0.2 MG: 2 INJECTION, SOLUTION INTRAMUSCULAR; INTRAVENOUS; SUBCUTANEOUS at 10:16

## 2019-06-27 RX ADMIN — HYDRALAZINE HYDROCHLORIDE 10 MG: 20 INJECTION INTRAMUSCULAR; INTRAVENOUS at 10:31

## 2019-06-27 RX ADMIN — PANTOPRAZOLE SODIUM 40 MG: 40 TABLET, DELAYED RELEASE ORAL at 08:17

## 2019-06-27 RX ADMIN — TRANEXAMIC ACID 1950 MG: 650 TABLET ORAL at 07:57

## 2019-06-27 RX ADMIN — HYDROMORPHONE HYDROCHLORIDE 0.3 MG: 2 INJECTION, SOLUTION INTRAMUSCULAR; INTRAVENOUS; SUBCUTANEOUS at 10:18

## 2019-06-27 RX ADMIN — KETAMINE HYDROCHLORIDE 20 MG: 10 INJECTION, SOLUTION INTRAMUSCULAR; INTRAVENOUS at 10:10

## 2019-06-27 RX ADMIN — ACETAMINOPHEN 1000 MG: 500 TABLET ORAL at 08:17

## 2019-06-27 RX ADMIN — LIDOCAINE HYDROCHLORIDE 60 MG: 20 INJECTION, SOLUTION EPIDURAL; INFILTRATION; INTRACAUDAL; PERINEURAL at 09:46

## 2019-06-27 RX ADMIN — Medication 10 MG: at 10:49

## 2019-06-27 RX ADMIN — Medication 2 G: at 18:09

## 2019-06-27 RX ADMIN — ASPIRIN 81 MG: 81 TABLET, COATED ORAL at 18:08

## 2019-06-27 RX ADMIN — MIDAZOLAM HYDROCHLORIDE 2 MG: 1 INJECTION, SOLUTION INTRAMUSCULAR; INTRAVENOUS at 09:08

## 2019-06-27 RX ADMIN — HYDROMORPHONE HYDROCHLORIDE 0.3 MG: 2 INJECTION, SOLUTION INTRAMUSCULAR; INTRAVENOUS; SUBCUTANEOUS at 10:14

## 2019-06-27 RX ADMIN — SODIUM CHLORIDE, SODIUM LACTATE, POTASSIUM CHLORIDE, AND CALCIUM CHLORIDE 125 ML/HR: 600; 310; 30; 20 INJECTION, SOLUTION INTRAVENOUS at 08:11

## 2019-06-27 RX ADMIN — ACETAMINOPHEN 650 MG: 325 TABLET ORAL at 18:08

## 2019-06-27 RX ADMIN — KETAMINE HYDROCHLORIDE 10 MG: 10 INJECTION, SOLUTION INTRAMUSCULAR; INTRAVENOUS at 10:25

## 2019-06-27 RX ADMIN — ASPIRIN 81 MG: 81 TABLET, COATED ORAL at 15:15

## 2019-06-27 RX ADMIN — KETOROLAC TROMETHAMINE 15 MG: 30 INJECTION, SOLUTION INTRAMUSCULAR at 15:15

## 2019-06-27 NOTE — PROGRESS NOTES
Problem: Mobility Impaired (Adult and Pediatric)  Goal: *Acute Goals and Plan of Care (Insert Text)  Description  Physical Therapy Goals   Initiated 6/27/2019 and to be accomplished within 3-5 day(s)  1. Patient will move from supine <> sit with S in prep for out of bed activity and change of position. 2.  Patient will perform sit<> stand with S with LRAD in prep for transfers/ambulation. 3.  Patient will transfer from bed <> chair with S with LRAD for time up in chair for completion of ADL activity. 4.  Patient will ambulate 150 feet with LRAD/S for improved functional mobility/safe discharge. 5.  Patient will ascend/descend >2 stairs with minimal assistance/contact guard assist for home re-entry as needed. Outcome: Resolved/Met   PHYSICAL THERAPY EVALUATION & DISCHARGE    Patient: Varsha Davenport (1 y.o. male)  Date: 6/27/2019  Primary Diagnosis: Osteoarthritis of left knee [M17.12]  Procedure(s) (LRB):  LEFT TOTAL KNEE REPLACEMENT, MANIPULATION UNDER ANESTHESIA RIGHT KNEE (Left) Day of Surgery   Precautions:Fall, WBAT    ASSESSMENT AND RECOMMENDATIONS:  Based on the objective data described below, the patient presents with decrease independence w/ bed mobility, transfers, gait, and step negotiation. Pt seen in supine prior to session w/ supplemental O2, IV, pulse ox, BP, and B/L SCDs donned. Pt reported no pain in L knee. Pt reported feeling woozy but BP assessed at 135/78 and HR in the 80s and O2 in the high 90s while sitting at the EOB. Pt has met all goals at this time. Pt able to ambulate w/ RW/ ft w/ min Vcing and difficulty and no signs of LOB at this time. Pt able to perform step negotiation using box step and RW to simulate home entry w/ min VCing and no signs of LOB. Pt transferred back to room where pt was educated on therex activity and able to perform task w/ min Vcing and difficulty.  Pt left sitting at the EOB after session, call bell and tray in reach, nurse notified after session. Pt has met all goals at this time, DC from acute PT. Skilled physical therapy is not indicated at this time. Discharge Recommendations: Home Health  Further Equipment Recommendations for Discharge: N/A      SUBJECTIVE:   Patient stated ? I feel woozy but I am okay. ?    OBJECTIVE DATA SUMMARY:     Past Medical History:   Diagnosis Date    Arthritis     knee, hand    Asthma     up to age 15    Hypertension 2004    Osteoarthritis of left knee 6/25/2019    Osteoarthritis of right knee 3/19/2018     Past Surgical History:   Procedure Laterality Date    HX CATARACT REMOVAL      HX HEENT      wisdom teeth removed    HX ORTHOPAEDIC Right 2018    TKR     Barriers to Learning/Limitations: yes;  physical  Compensate with: visual, verbal, tactile, kinesthetic cues/model  Prior Level of Function/Home Situation:   Home Situation  Home Environment: Private residence  # Steps to Enter: 2  Rails to Enter: No  One/Two Story Residence: Two story, live on 1st floor  Lift Chair Available: No  Living Alone: No  Support Systems: Friends \ neighbors, Spouse/Significant Other/Partner  Patient Expects to be Discharged to[de-identified] Private residence  Current DME Used/Available at Home: [address], [address]  Critical Behavior:  Neurologic State: Alert  Orientation Level: Oriented X4  Skin Condition/Temp: Dry;Warm  Skin Integrity: Incision (comment)(L knee)  Skin Integumentary  Skin Color: Appropriate for ethnicity  Skin Condition/Temp: Dry;Warm  Skin Integrity: Incision (comment)(L knee)  Turgor: Non-tenting  Strength:    Strength: Generally decreased, functional  Tone & Sensation:   Tone: Normal  Sensation: Intact  Range Of Motion:  AROM: Generally decreased, functional  Functional Mobility:  Bed Mobility:  Supine to Sit: Supervision  Sit to Supine: Supervision  Scooting: Supervision  Transfers:  Sit to Stand: Supervision  Stand to Sit: Supervision  Balance:   Sitting: Intact  Standing: Intact; With support  Ambulation/Gait Training:  Distance (ft): 150 Feet (ft)  Assistive Device: Gait belt;Walker, rolling  Ambulation - Level of Assistance: Supervision  Gait Description (WDL): Exceptions to WDL  Gait Abnormalities: Antalgic;Decreased step clearance  Left Side Weight Bearing: As tolerated  Base of Support: Shift to right;Widened  Stance: Left decreased  Speed/Arlene: Slow  Step Length: Left shortened;Right shortened  Swing Pattern: Left asymmetrical;Right asymmetrical  Therapeutic Exercises:       EXERCISE   Sets   Reps   Active Active Assist   Passive Self ROM   Comments   Ankle Pumps 1 1  ? ? ? ?    Quad Sets/Glut Sets 1 1 ? ? ? ? Hold for 5 secs   Hamstring Sets 1 1 ? ? ? ? Hold for 5 secs   Short Arc Quads 1 1 ? ? ? ? Heel Slides 1 1 ? ? ? ? Straight Leg Raises 1 1 ? ? ? ? Hip Abd/Add   ? ? ? ? Long Arc Quads 1 1 ? ? ? ? Seated Marching   ? ? ? ? Standing Marching   ? ? ? ?       ? ? ? ? Pain:  Pain Scale 1: Numeric (0 - 10)  Pain Intensity 1: 0  Activity Tolerance:   Good  Please refer to the flowsheet for vital signs taken during this treatment. After treatment:   ?         Patient left in no apparent distress sitting up in chair  ? Patient left in no apparent distress in bed  ? Call bell left within reach  ? Nursing notified  ? Caregiver present (son-in-law)  ? Bed alarm activated    COMMUNICATION/EDUCATION:   ?         Fall prevention education was provided and the patient/caregiver indicated understanding. ? Patient/family have participated as able in goal setting and plan of care. ?         Patient/family agree to work toward stated goals and plan of care. ?         Patient understands intent and goals of therapy, but is neutral about his/her participation. ? Patient is unable to participate in goal setting and plan of care.     Thank you for this referral.  Samira Hartley, PT   Time Calculation: 35 mins   Eval Complexity: History: HIGH Complexity :3+ comorbidities / personal factors will impact the outcome/ POC Exam:LOW Complexity : 1-2 Standardized tests and measures addressing body structure, function, activity limitation and / or participation in recreation  Presentation: LOW Complexity : Stable, uncomplicated  Clinical Decision Making:Low Complexity ambulate >30 ft  Overall Complexity:LOW

## 2019-06-27 NOTE — ANESTHESIA POSTPROCEDURE EVALUATION
Post-Anesthesia Evaluation and Assessment    Cardiovascular Function/Vital Signs  Visit Vitals  /67   Pulse 72   Temp 36.9 °C (98.4 °F)   Resp 15   Ht 5' 9\" (1.753 m)   Wt 87.6 kg (193 lb 3.2 oz)   SpO2 98%   BMI 28.53 kg/m²       Patient is status post Procedure(s):  LEFT TOTAL KNEE REPLACEMENT, MANIPULATION UNDER ANESTHESIA RIGHT KNEE. Nausea/Vomiting: Controlled. Postoperative hydration reviewed and adequate. Pain:  Pain Scale 1: FLACC (06/27/19 1113)  Pain Intensity 1: 0 (06/27/19 1113)   Managed. Neurological Status:   Neuro (WDL): Exceptions to WDL (06/27/19 1113)   At baseline. Mental Status and Level of Consciousness: Arousable. Pulmonary Status:   O2 Device: Nasal cannula (06/27/19 1145)   Adequate oxygenation and airway patent. Complications related to anesthesia: None    Post-anesthesia assessment completed. No concerns. Patient has met all discharge requirements.     Signed By: Denita Duarte MD    June 27, 2019

## 2019-06-27 NOTE — ANESTHESIA PROCEDURE NOTES
Peripheral Block    Start time: 6/27/2019 9:08 AM  End time: 6/27/2019 9:12 AM  Performed by: Clayton Stallworth MD  Authorized by: Clayton Stallworth MD       Pre-procedure: Indications: at surgeon's request and post-op pain management    Preanesthetic Checklist: patient identified, risks and benefits discussed, site marked, timeout performed, anesthesia consent given and patient being monitored      Block Type:   Block Type:   Adductor canal  Laterality:  Left  Monitoring:  Standard ASA monitoring, continuous pulse ox, frequent vital sign checks, heart rate, responsive to questions and oxygen  Injection Technique:  Single shot  Procedures: ultrasound guided    Patient Position: supine  Prep: chlorhexidine    Needle Type:  Stimuplex  Needle Gauge:  21 G  Needle Localization:  Anatomical landmarks and ultrasound guidance    Assessment:  Number of attempts:  1  Injection Assessment:  Incremental injection every 5 mL, local visualized surrounding nerve on ultrasound, negative aspiration for blood, no paresthesia, no intravascular symptoms and ultrasound image on chart  Patient tolerance:  Patient tolerated the procedure well with no immediate complications

## 2019-06-27 NOTE — INTERVAL H&P NOTE
H&P Update: 
Samir Melara was seen and examined. History and physical has been reviewed. The patient has been examined.  There have been no significant clinical changes since the completion of the originally dated History and Physical.

## 2019-06-27 NOTE — DISCHARGE SUMMARY
[address] [address]     DISCHARGE SUMMARY     PATIENT: Ben Ayon     MRN: [id number]   ADMIT DATE: 2019   [facility]   DISCHARGE DATE: 2019     ATTENDING: Dario Wolfe MD   DICTATING: SANA Baker         ADMISSION DIAGNOSIS: Osteoarthritis of left knee [M17.12]    DISCHARGE DIAGNOSIS: Status post LEFT TOTAL KNEE ARTHROPLASTY    HISTORY OF PRESENT ILLNESS: The patient is a 66y.o. year-old male   with ongoing left knee pain secondary to osteoarthritis of his left knee. The patient's pain has persisted and progressed despite conservative treatments and therapies. The patient has at this time opted for surgical intervention. PAST MEDICAL HISTORY:   Past Medical History:   Diagnosis Date    Arthritis     knee, hand    Asthma     up to age 15    Hypertension     Osteoarthritis of left knee 2019    Osteoarthritis of right knee 3/19/2018       PAST SURGICAL HISTORY:   Past Surgical History:   Procedure Laterality Date    HX CATARACT REMOVAL      HX HEENT      wisdom teeth removed    HX ORTHOPAEDIC Right 2018    TKR       ALLERGIES:   Allergies   Allergen Reactions    Shrimp Other (comments)     Irritation on hands        CURRENT MEDICATIONS:  A list of medications prior to the time of admission include:  Prior to Admission medications    Medication Sig Start Date End Date Taking? Authorizing Provider   aspirin delayed-release 81 mg tablet Take 1 Tab by mouth two (2) times a day for 21 days. 19 Yes Levar Self PA   simvastatin (ZOCOR) 10 mg tablet Take 20 mg by mouth nightly. Yes Provider, Historical   losartan-hydroCHLOROthiazide (HYZAAR) 100-25 mg per tablet Take 1 Tab by mouth daily. Yes Provider, Historical   docusate sodium (COLACE) 50 mg capsule Take 50 mg by mouth two (2) times a day.     Provider, Historical   polyethylene glycol (MIRALAX) 17 gram packet Take 17 g by mouth daily. Provider, Historical       FAMILY HISTORY: History reviewed. No pertinent family history. SOCIAL HISTORY:   Social History     Socioeconomic History    Marital status:      Spouse name: Not on file    Number of children: Not on file    Years of education: Not on file    Highest education level: Not on file   Tobacco Use    Smoking status: Never Smoker    Smokeless tobacco: Never Used   Substance and Sexual Activity    Alcohol use: No    Drug use: No       REVIEW OF SYSTEMS: All review of systems are negative. PHYSICAL EXAMINATION: For a detailed physical exam, please refer to the patient's chart. HOSPITAL COURSE: The patient was taken to surgery the day of admission. he underwent a left total knee replacement. Operative course was benign. Estimated blood loss was approximately 150 cc. The patient was taken to the PACU in stable condition and was later taken to the floor in stable condition. During his hospital stay, the patient progressed well with physical therapy and occupational therapy, adherent to instructions. he had been cleared by physical therapy with stair training. he was placed on Aspirin for DVT prophylaxis. his pain has been well controlled with oral pain medications. his vitals have remained stable. he has also remained hemodynamically stable. The patient has been recommended for discharge home. DISCHARGE INSTRUCTIONS: The patient is to be discharged home with the following medication changes:     Discharge Medication List as of 6/27/2019  5:55 PM      START taking these medications    Details   aspirin delayed-release 81 mg tablet Take 1 Tab by mouth two (2) times a day for 21 days. , Print, Disp-42 Tab, R-0      meloxicam (MOBIC) 7.5 mg tablet Take 1 Tab by mouth two (2) times a day for 14 days. , Print, Disp-28 Tab, R-0      oxyCODONE-acetaminophen (PERCOCET) 5-325 mg per tablet Take 1 Tab by mouth every four (4) hours as needed for Pain for up to 5 days. Max Daily Amount: 6 Tabs. Take 1-2 tablets by mouth every 4-6 hours as needed for pain. , Print, Disp-60 Tab, R-0         CONTINUE these medications which have NOT CHANGED    Details   simvastatin (ZOCOR) 10 mg tablet Take 20 mg by mouth nightly., Historical Med      losartan-hydroCHLOROthiazide (HYZAAR) 100-25 mg per tablet Take 1 Tab by mouth daily. , Historical Med         STOP taking these medications       ibuprofen (MOTRIN) 200 mg tablet Comments:   Reason for Stopping:                   The patient is to continue at home with home physical therapy 3 times a week to work on gait training, range of motion, strengthening, and weightbearing exercises as tolerated on the left lower extremity. The patient is to progress from a walker to a cane to complete total weightbearing as tolerable. The patient is to continue to keep his incision dry. The patient is to followup with Dr. Darin Hardin, Divya Hopkins PA-C and/or Ty Adame PA-C in the office approximately 10-14 days status post for x-rays and further evaluation.     SANA Cartwright  7/18/2019

## 2019-06-27 NOTE — PERIOP NOTES
TRANSFER - OUT REPORT:    Verbal report given to JOHN Martinez RN(name) on Joshua Siegel  being transferred to [facility], ortho, 209(unit) for routine progression of care       Report consisted of patients Situation, Background, Assessment and   Recommendations(SBAR). Information from the following report(s) SBAR, OR Summary, Procedure Summary and Intake/Output was reviewed with the receiving nurse. Lines:   Peripheral IV 06/27/19 Left; Outer Forearm (Active)   Site Assessment Clean, dry, & intact 6/27/2019 12:15 PM   Phlebitis Assessment 0 6/27/2019 12:15 PM   Infiltration Assessment 0 6/27/2019 12:15 PM   Dressing Status Clean, dry, & intact 6/27/2019 12:15 PM   Dressing Type Tape;Transparent 6/27/2019 12:15 PM   Hub Color/Line Status Green; Infusing 6/27/2019 12:15 PM        Opportunity for questions and clarification was provided.       Patient transported with:   O2 @ 2 liters  Registered Nurse  Tech       Intake/Output Summary (Last 24 hours) at 6/27/2019 1232  Last data filed at 6/27/2019 1107  Gross per 24 hour   Intake 2100 ml   Output 350 ml   Net 1750 ml

## 2019-06-27 NOTE — PROGRESS NOTES
1239 Received client from PACU in satisfactory condition. Client is a pt of Dr. Neena Denver. Pt had a L Total Knee Replacement today. Client is A/O X 4. Client is calm and cooperative. No numbness or tingling to any extremity. Present Posterior Tibial and Dorsalis Pedis pulses. Capillary Refill less than 3 seconds. Skin is warm , dry and skin color is appropriate to race. Bibasilar breath sounds clear and equal. Bowel sounds active to all 4 quadrants. Abdomen is soft and non-tender. Client has not voided post-operatively. No bladder distention evident. No complaints of bladder discomfort. Client has a Mepilex dressing to the L knee. Dressing is CDI, ice at site. No other skin integrity issues present. Arben hose applied. Sequential compression device applied. Client has 18 gauge PIV present and running NS @ 300. Clients pain is 2/10 on 0-10 scale. Client oriented to call bell use as well as bed use. Client oriented to phone and how to order meals. Call bell within reach. Bed in low position. Three side rails up. IS educated and encouraged. 1745 Dual AVS reviewed with Thao Goff RN. I have reviewed discharge instructions with the patient. The patient verbalized understanding. Discharge medications reviewed with patient and appropriate educational materials and side effects teaching were provided.

## 2019-06-27 NOTE — DISCHARGE INSTRUCTIONS
[address] [place] [address] Sports Medicine   Patient Discharge Instructions    Grecia De Leon / [id number] : 1940    Admitted 2019 Discharged: 2019     IF YOU HAVE ANY PROBLEMS ONCE YOU ARE AT HOME CALL THE FOLLOWING NUMBERS:   Main office number: [phone]    Your follow up appointment to see either Dr. Kory Garcia PASHONNA, or [facility] P[facility]NA as scheduled in 2 weeks. If you are unsure of your appointment date call the office at [phone]. Medication Instructions     · Resume your home medictions as directed, you may have directed not to resume supplements until after your follow up. · A prescription for pain medication has been given   · It is important that you take the medication exactly as they are prescribed. · Keep your medication in the bottles provided by the pharmacist and keep a list of the medication names, dosages, and times to be taken in your wallet. · Do not take other medications without consulting your doctor. What to do at [address] [place] [address] Ave your prehospital diet. If you have excessive nausea or vomitting call your doctor's office. Be sure to maintain adequate fluid intake. Some pain medications may cause constipation. Remember to drink fluids, stay as active as possible, and eat plenty of fiber-rich foods. Begin In-Home Physical Therapy; 3 times a week to work on gait training, range of motion, strengthening, and weight bearing exercises as tolerable. Continue to use your walker or cane when walking. May progress from the walker to a cane to complete total bearing as tolerable. Patient may shower. Wrap incision with plastic wrap/covering to prevent incision from getting wet. Avoid complete immersion. YOUR DRESSING SHOULD BE CHANGED IN 3-5 DAYS BY [facility] NURSE.       When to Call    - Call if you have a temperature greater then 101  - Unable to keep food down  - Are unable to bear any wieght   - Need a pain medication refill     Information obtained by :  I understand that if any problems occur once I am at home I am to contact my physician. I understand and acknowledge receipt of the instructions indicated above.                                                                                                                                            Physician's or R.N.'s Signature                                                                  Date/Time                                                                                                                                              Patient or Representative Signature                                                          Date/Time

## 2019-06-27 NOTE — PROGRESS NOTES
conducted a pre-surgery visit with Odalis Drummond, who is a 66 y. o.,male. The  provided the following Interventions:  Initiated a relationship of care and support. Offered prayer and assurance of continued prayers on patient's behalf. Plan:  Chaplains will continue to follow and will provide pastoral care on an as needed/requested basis.  recommends bedside caregivers page  on duty if patient shows signs of acute spiritual or emotional distress.       [address] [facility]   [phone]

## 2019-06-27 NOTE — PROGRESS NOTES
Transition of Care (LAUREN) Plan:    Informed pt would like to discharge home today if able to clear PT for home. Met with pt and son in law who will be home to assist pt. FOC offered, pt chose [address]  for follow up; referral placed with CMS. Pt has RW for home. LAUREN Transportation:   How is patient being transported at discharge? Family/Friend      When? Once cleared by Therapy      Is transport scheduled? N/A      Follow-up appointment and transportation:   PCP/Specialist?  See AVS for Appointment         Who is transporting to the follow-up appointment? Family/Friend      Is transport for follow up appointment scheduled? N/A    Communication plan (with patient/family): Who is being called? Patient or Next of Kin? Responsible party? Patient      What number(s) is to be used? See Facesheet      What service provider is calling for [facility] services? When are they calling? 24-48 hours following discharge    Readmission Risk? (Green/Low; Yellow/Moderate; Red/High):  Green    Care Management Interventions  PCP Verified by CM:  Yes  Transition of Care Consult (CM Consult): [address]: Yes  Discharge Durable Medical Equipment: No  Physical Therapy Consult: Yes  Occupational Therapy Consult: Yes  Current Support Network: Own Home  Confirm Follow Up Transport: Family  Plan discussed with Pt/Family/Caregiver: Yes  Freedom of Choice Offered: Yes  Discharge Location  Discharge Placement: Home with home health

## 2019-06-27 NOTE — PROGRESS NOTES
Pt transported to room 209. Jayne Poly is at bedside. Pt skin assessment performed and nothing new noted at this time. Pt dressings CDI at this time. PT NAD at this time.      Visit Vitals  BP (P) 126/59   Pulse (P) 70   Temp (P) 97.3 °F (36.3 °C)   Resp (P) 16   Ht 5' 9\" (1.753 m)   Wt 87.6 kg (193 lb 3.2 oz)   SpO2 (P) 98%   BMI 28.53 kg/m²     Date 06/26/19 0700 - 06/27/19 0659(Not Admitted) 06/27/19 0700 - 06/28/19 0659   Shift 0676-2006 8993-9481 24 Hour Total 5082-5078 7912-5044 24 Hour Total   INTAKE   I.V.    2100 2100     I.V.    2100 2100   Shift Total(mL/kg)    5639(82)  9030(84)   OUTPUT   Blood    350  350     Estimated Blood Loss    350  350   Shift Total(mL/kg)    350(4)  350(4)   NET    1750  1750   Weight (kg)    87.6 87.6 87.6

## 2019-06-27 NOTE — ANESTHESIA PREPROCEDURE EVALUATION
Relevant Problems   No relevant active problems       Anesthetic History   No history of anesthetic complications     Pertinent negatives: No pseudocholinesterase deficiency and malignant hyperthermia       Review of Systems / Medical History  Patient summary reviewed, nursing notes reviewed and pertinent labs reviewed    Pulmonary            Asthma (as child) : well controlled       Neuro/Psych   Within defined limits           Cardiovascular    Hypertension            Pertinent negatives: No CABG  Exercise tolerance: >4 METS     GI/Hepatic/Renal  Within defined limits              Endo/Other        Arthritis     Other Findings              Physical Exam    Airway  Mallampati: II  TM Distance: 4 - 6 cm  Neck ROM: normal range of motion   Mouth opening: Normal     Cardiovascular  Regular rate and rhythm,  S1 and S2 normal,  no murmur, click, rub, or gallop  Rhythm: regular  Rate: normal         Dental  No notable dental hx       Pulmonary  Breath sounds clear to auscultation               Abdominal  GI exam deferred       Other Findings            Anesthetic Plan    ASA: 2  Anesthesia type: general      Post-op pain plan if not by surgeon: peripheral nerve block single    Induction: Intravenous  Anesthetic plan and risks discussed with: Patient and Son / Daughter

## 2019-06-27 NOTE — PERIOP NOTES
18 [facility] made aware that the SBAR is ready. Annette TORRES will be accepting report.

## 2019-06-27 NOTE — OP NOTES
[address] [facility] [facility]  Total Knee Arthroplasty    Patient: Justina Montez MRN: [id number]  SSN: [id number]    YOB: 1940  Age: 66 y.o. Sex: male      Date of Surgery: 6/27/2019   Preoperative Diagnosis: UNILATERAL PRIMARY OSTEOARTHRITIS, LEFT KNEE   Postoperative Diagnosis: UNILATERAL PRIMARY OSTEOARTHRITIS, LEFT KNEE   Location: [facility]  Surgeon: Brian Bedolla MD  Assistant: Jennie Raymundo PA-C    Anesthesia: General and adductor canal Nerve Block    Procedure: Total Knee Arthroplasty: orthodevelopment BKS trimax  The complexity of the total joint surgery requires the use of a first assistant for positioning, retraction and assistance in closure. Tourniquet Time: Tourniquet not used. Estimated Blood Loss: Less than 100cc     Implants:   Implant Name Type Inv. Item Serial No.  Lot No. LRB No. Used Action   CEMENT BNE SMARTSET HV 40GM -- ORDER IN SETS OF 20 - [id number]  CEMENT BNE SMARTSET HV 40GM -- ORDER IN SETS OF 20  [employer] [id number] Left 1 Implanted   CEMENT BNE SMARTSET HV 40GM -- ORDER IN SETS OF 20 - [id number]  CEMENT BNE SMARTSET HV 40GM -- ORDER IN SETS OF 20  [facility] [id number] Left 1 Implanted   IMPL CR FEM NP SZ6 LT -- BKS TRIMAX - [id number]  IMPL CR FEM NP SZ6 LT -- BKS TRIMAX  [employer] [id number] Left 1 Implanted   PATELLA 35MM -- BKS E-VITALIZE - [id number]  PATELLA 35MM -- BKS E-VITALIZE  [employer] [id number] Left 1 Implanted   INSERT TIB CR SZ5 7MM -- BKS E-VITALIZE - [id number]  INSERT TIB CR SZ5 7MM -- BKS E-VITALIZE  [employer] [id number] Left 1 Implanted   TRAY TIB NP SZ5 -- BALANCED KNEE SYSTEM - [id number]  TRAY TIB NP SZ5 -- BALANCED KNEE SYSTEM  [employer] [id number] Left 1 Implanted        Specimens: None    Additional Findings: None     Complications: none    Body Mass Index: Body mass index is 28.53 kg/m².     Procedure Detail:  Prior to the surgery the patient was administered a femoral nerve block in the preoperative holding area by the anesthesiologist. Brittany Alvarez was brought to the operating room and positioned on the operating table. He was anesthetized with anesthesia. Intravenous antibiotics were administered. Prior to the incision being made a timeout was called identifying the patient, procedure, operative side, and surgeon. A pneumatic tourniquet was placed about the limb and the left leg was prepped and draped in the usual sterile manner. The tourniquet was not inflated throughout the case. A midline anterior incision made over the knee. The incision was carried down through the subcutaneous tissue to the underlying capsule. A medial parapatellar capsular incision was performed. The medial capsular flap was carefully elevated around to the posterior medial corner protecting the medial collateral ligaments and the fibers. The patella was sized with a caliper, and approximately 10-12 mm was resected with an oscillating saw allowing the patella to be slid into the lateral gutter. It was not everted throughout the case. Our attention was first turned to the distal femur and using intermedullary instrumentation, a 5-degree valgus cut was on the distal end of the femur. The distal end of the femur was sized to a size 6 femoral component. Pins were inserted through the sizer and the corresponding 4-in-1 block was slid into place and pinned for stability. Anterior and posterior and chamfer cuts were made to accommodate the femoral component. The medial and lateral menisci were excised as were the anterior cruciate ligaments. Our attention was then turned to the tibia. Using extramedullary instrumentation, a 3-degree cut was made on the proximal end of the tibia. A spacer block was placed to show gaps had been balanced and a size 5  tibial base plate was placed on the tibia and pinned into place.  Intramedullary reaming guide was placed on the tibia and the appropriate reamer was used followed by the keel punch to complete the preparation of the tibia. A trial femoral component was then impacted on to the distal end of the femur. Trial reduction was then performed with incremental size trial bearing surfaces. The orthodevelopment BKS trimax CR 7 mm bearing surface was inserted and allowed for full extension, good medial, lateral stability at 90 degrees of flexion, especially medially. Our attention was then turned to the patella. The patella was sized to a 35mm patella. The guide was pinned, placed over patella, 3 holes were drilled. Trial patella button was inserted and the patella was reduced in the knee. The patella tracked normally using no-touch technique. The trial components were then all removed. The real components were opened on the back. The cut surfaces of the bone were prepared using the PulsaVac lavage. Prior to this, the Aquamantys was used to cauterize any soft tissue bleeding. 40 grams of Depuy HV cement was mixed. The femoral and tibial components  and patellar component was cemented into place. Excess cement was removed from around the edge of bone using plastic curette. Once the cement had hardened, the knee was placed through range of motion and noted to be stable as mentioned above with the trail components. The wound was dry, therefore no drain was used. The operative knee was injected with 20 cc of exparel. The knee was then soaked with a diluted betadine solution for approximately 3 min. This was then thoroughly irrigated. The capsular layer was closed using a #2 stratafix suture with the knee flexed 90 degrees, while subcutaneous layers were closed using 2-0 Vicryl suture. Finally the skin was closed using Prineo, which were applied in occlusive fashion and sterile bandage applied. All sponge and needle counts were correct. He was taken to the recovery room extubated in stable condition.     Signed By: Homero Rm MD     June 27, 2019

## 2019-06-28 ENCOUNTER — HOME CARE VISIT (OUTPATIENT)
Dept: SCHEDULING | Facility: HOME HEALTH | Age: 79
End: 2019-06-28
Payer: MEDICARE

## 2019-06-28 PROCEDURE — G0299 HHS/HOSPICE OF RN EA 15 MIN: HCPCS

## 2019-06-28 PROCEDURE — G0151 HHCP-SERV OF PT,EA 15 MIN: HCPCS

## 2019-06-28 PROCEDURE — 400013 HH SOC

## 2019-06-29 VITALS
SYSTOLIC BLOOD PRESSURE: 128 MMHG | HEART RATE: 64 BPM | TEMPERATURE: 98.9 F | DIASTOLIC BLOOD PRESSURE: 72 MMHG | OXYGEN SATURATION: 95 %

## 2019-07-01 ENCOUNTER — HOME CARE VISIT (OUTPATIENT)
Dept: SCHEDULING | Facility: HOME HEALTH | Age: 79
End: 2019-07-01
Payer: MEDICARE

## 2019-07-01 VITALS
HEART RATE: 54 BPM | DIASTOLIC BLOOD PRESSURE: 72 MMHG | OXYGEN SATURATION: 98 % | TEMPERATURE: 97.9 F | RESPIRATION RATE: 16 BRPM | SYSTOLIC BLOOD PRESSURE: 146 MMHG

## 2019-07-01 PROCEDURE — G0157 HHC PT ASSISTANT EA 15: HCPCS

## 2019-07-01 PROCEDURE — G0300 HHS/HOSPICE OF LPN EA 15 MIN: HCPCS

## 2019-07-03 ENCOUNTER — HOME CARE VISIT (OUTPATIENT)
Dept: SCHEDULING | Facility: HOME HEALTH | Age: 79
End: 2019-07-03
Payer: MEDICARE

## 2019-07-03 VITALS
SYSTOLIC BLOOD PRESSURE: 134 MMHG | RESPIRATION RATE: 16 BRPM | OXYGEN SATURATION: 97 % | DIASTOLIC BLOOD PRESSURE: 77 MMHG | HEART RATE: 74 BPM

## 2019-07-03 PROCEDURE — G0157 HHC PT ASSISTANT EA 15: HCPCS

## 2019-07-04 ENCOUNTER — HOME CARE VISIT (OUTPATIENT)
Dept: SCHEDULING | Facility: HOME HEALTH | Age: 79
End: 2019-07-04
Payer: MEDICARE

## 2019-07-04 PROCEDURE — G0300 HHS/HOSPICE OF LPN EA 15 MIN: HCPCS

## 2019-07-05 ENCOUNTER — HOME CARE VISIT (OUTPATIENT)
Dept: SCHEDULING | Facility: HOME HEALTH | Age: 79
End: 2019-07-05
Payer: MEDICARE

## 2019-07-05 PROCEDURE — G0157 HHC PT ASSISTANT EA 15: HCPCS

## 2019-07-08 ENCOUNTER — HOME CARE VISIT (OUTPATIENT)
Dept: SCHEDULING | Facility: HOME HEALTH | Age: 79
End: 2019-07-08
Payer: MEDICARE

## 2019-07-08 VITALS
SYSTOLIC BLOOD PRESSURE: 138 MMHG | OXYGEN SATURATION: 96 % | TEMPERATURE: 98.7 F | RESPIRATION RATE: 16 BRPM | DIASTOLIC BLOOD PRESSURE: 88 MMHG | HEART RATE: 88 BPM

## 2019-07-08 VITALS
DIASTOLIC BLOOD PRESSURE: 80 MMHG | HEART RATE: 82 BPM | TEMPERATURE: 97.7 F | OXYGEN SATURATION: 98 % | SYSTOLIC BLOOD PRESSURE: 144 MMHG | RESPIRATION RATE: 18 BRPM

## 2019-07-08 VITALS
HEART RATE: 75 BPM | DIASTOLIC BLOOD PRESSURE: 70 MMHG | SYSTOLIC BLOOD PRESSURE: 130 MMHG | OXYGEN SATURATION: 97 % | RESPIRATION RATE: 14 BRPM

## 2019-07-08 VITALS
DIASTOLIC BLOOD PRESSURE: 69 MMHG | OXYGEN SATURATION: 97 % | HEART RATE: 73 BPM | RESPIRATION RATE: 16 BRPM | SYSTOLIC BLOOD PRESSURE: 128 MMHG

## 2019-07-08 PROCEDURE — G0157 HHC PT ASSISTANT EA 15: HCPCS

## 2019-07-08 PROCEDURE — G0299 HHS/HOSPICE OF RN EA 15 MIN: HCPCS

## 2019-07-09 VITALS
RESPIRATION RATE: 16 BRPM | SYSTOLIC BLOOD PRESSURE: 130 MMHG | HEART RATE: 73 BPM | DIASTOLIC BLOOD PRESSURE: 68 MMHG | OXYGEN SATURATION: 97 %

## 2019-07-10 ENCOUNTER — HOME CARE VISIT (OUTPATIENT)
Dept: SCHEDULING | Facility: HOME HEALTH | Age: 79
End: 2019-07-10
Payer: MEDICARE

## 2019-07-10 PROCEDURE — G0157 HHC PT ASSISTANT EA 15: HCPCS

## 2019-07-11 ENCOUNTER — HOME CARE VISIT (OUTPATIENT)
Dept: SCHEDULING | Facility: HOME HEALTH | Age: 79
End: 2019-07-11
Payer: MEDICARE

## 2019-07-11 VITALS
HEART RATE: 78 BPM | OXYGEN SATURATION: 98 % | SYSTOLIC BLOOD PRESSURE: 128 MMHG | TEMPERATURE: 97.9 F | DIASTOLIC BLOOD PRESSURE: 76 MMHG

## 2019-07-11 PROCEDURE — G0151 HHCP-SERV OF PT,EA 15 MIN: HCPCS

## 2019-07-12 VITALS
DIASTOLIC BLOOD PRESSURE: 74 MMHG | OXYGEN SATURATION: 97 % | HEART RATE: 78 BPM | RESPIRATION RATE: 14 BRPM | SYSTOLIC BLOOD PRESSURE: 131 MMHG

## 2019-07-16 ENCOUNTER — HOME CARE VISIT (OUTPATIENT)
Dept: SCHEDULING | Facility: HOME HEALTH | Age: 79
End: 2019-07-16
Payer: MEDICARE

## 2019-07-16 VITALS
RESPIRATION RATE: 16 BRPM | OXYGEN SATURATION: 97 % | SYSTOLIC BLOOD PRESSURE: 144 MMHG | HEART RATE: 79 BPM | TEMPERATURE: 99.6 F | DIASTOLIC BLOOD PRESSURE: 82 MMHG

## 2019-07-16 PROCEDURE — G0299 HHS/HOSPICE OF RN EA 15 MIN: HCPCS

## 2024-03-03 NOTE — PROGRESS NOTES
73 yo M with lower abdominal pain since this morning with nausea and chills. No vomitting. Last BM this morning  CT shows possible SBO. However No loss of bowel wall enhancement or pneumatosis intestinalis to suggest bowel ischemia  WBC 16k and lactic initially 3.5 s/p IVF and normalized  At the time of admission patient is passing gas, no nausea/vomiting   General surgery consulted - appreciate reccs  Requested for SLIM admit for observation  No surgical intervention at this time  Maintain NPO status, advance to clear liquids as able  Continue IVF w  cc x 10 hr  PRN zofran for nausea   Tylenol for pain control, avoiding opiods  Holding off on NGT tube at this time, consider insertion should pain worsen / N/V   Problem: Falls - Risk of  Goal: *Absence of Falls  Document Jackie Fall Risk and appropriate interventions in the flowsheet.    Outcome: Progressing Towards Goal  Fall Risk Interventions:  Mobility Interventions: Utilize walker, cane, or other assitive device, Patient to call before getting OOB         Medication Interventions: Teach patient to arise slowly, Patient to call before getting OOB    Elimination Interventions: Call light in reach, Toileting schedule/hourly rounds, Patient to call for help with toileting needs

## (undated) DEVICE — REM POLYHESIVE ADULT PATIENT RETURN ELECTRODE: Brand: VALLEYLAB

## (undated) DEVICE — SUTURE STRATAFIX SYMMETRIC PDS + 1 SGL ARMED CT 18 IN LEN SXPP1A405

## (undated) DEVICE — SOLUTION IV 100ML 0.9% SOD CHL DIL INJ

## (undated) DEVICE — NDL PRT INJ NSAF BLNT 18GX1.5 --

## (undated) DEVICE — UNDERCAST PADDING: Brand: DEROYAL

## (undated) DEVICE — BLADE SAW W13XL90MM 1.19MM PARA

## (undated) DEVICE — SOL IRRIGATION INJ NACL 0.9% 500ML BTL

## (undated) DEVICE — TRAY PHAR SYR 30ML PLAS LUERLOCK TIP N CTRL CONVENIENCE

## (undated) DEVICE — SUT VCRL + 1 36IN CT1 VIO --

## (undated) DEVICE — SOL INJ L R 1000ML BG --

## (undated) DEVICE — SYR 50ML LR LCK 1ML GRAD NSAF --

## (undated) DEVICE — NDL SPNE QNCKE 18GX3.5IN LF --

## (undated) DEVICE — SUT VCRL + 2-0 36IN CT1 UD --

## (undated) DEVICE — PIN GUIDE FIX 3.2X62 MM SCREW [GS903A0620322P] [KOMET MEDICAL]

## (undated) DEVICE — NEEDLE SPNL 20GA L3.5IN YEL HUB S STL REG WALL FIT STYL W/

## (undated) DEVICE — 3 BONE CEMENT MIXER: Brand: MIXEVAC

## (undated) DEVICE — SOLUTION SCRB 4OZ 10% PVP I POVIDONE IOD TOP PAINT EXIDINE

## (undated) DEVICE — THE CANADY HYBRID PLASMA SCALPEL IS AN ELECTROSURGICAL PLASMA SCALPEL THAT USES AN 85MM BENDABLE PADDLE BLADE TIP. THE ELECTROSURGICAL PLASMA SCALPEL IS USED TO SIMULTANEOUSLY CUT AND COAGULATE BIOLOGICAL TISSUE.: Brand: CANADY HYBRID PLASMA PADDLE BLADE

## (undated) DEVICE — CONCISE CEMENT SCULPS KIT: Brand: CONCISE

## (undated) DEVICE — PIN GUIDE FIX 3.2X62 MM SCREW [GS9030620324P] [KOMET MEDICAL]

## (undated) DEVICE — SYR 3ML LL TIP 1/10ML GRAD --

## (undated) DEVICE — DRSG MEPILES BORDER AG 4X12 -- 5/BX

## (undated) DEVICE — ADHESIVE TISS CLOSURE 22X4 CM 4 CC HI VISC EXOFIN

## (undated) DEVICE — ZIP 16 SURGICAL SKIN CLOSURE DEVICE: Brand: ZIP 16 SURGICAL SKIN CLOSURE DEVICE

## (undated) DEVICE — Device

## (undated) DEVICE — SYRINGE MED 20ML STD CLR PLAS LUERLOCK TIP N CTRL DISP

## (undated) DEVICE — GARMENT COMPR L FOR 13-16IN FT INTMIT SGL BLDR HEM FORC II

## (undated) DEVICE — GOWN,SIRUS,NONRNF,SETINSLV,XL,20/CS: Brand: MEDLINE

## (undated) DEVICE — BLADE SAW 1.19X20X90 MM FOR LG BNE

## (undated) DEVICE — HANDPIECE SET WITH FAN SPRAY TIP: Brand: INTERPULSE

## (undated) DEVICE — PLUS HANDPIECE WITH SPRAY TIP: Brand: SURGILAV

## (undated) DEVICE — KENDALL SCD EXPRESS FOOT CUFF, MEDIUM: Brand: KENDALL SCD